# Patient Record
Sex: FEMALE | URBAN - METROPOLITAN AREA
[De-identification: names, ages, dates, MRNs, and addresses within clinical notes are randomized per-mention and may not be internally consistent; named-entity substitution may affect disease eponyms.]

---

## 2019-07-12 ENCOUNTER — BEH TREATMENT PLAN (OUTPATIENT)
Dept: BEHAVIORAL HEALTH | Facility: CLINIC | Age: 34
End: 2019-07-12
Attending: PSYCHIATRY & NEUROLOGY

## 2019-07-12 ENCOUNTER — HOSPITAL ENCOUNTER (OUTPATIENT)
Dept: BEHAVIORAL HEALTH | Facility: CLINIC | Age: 34
Discharge: HOME | End: 2019-07-12
Attending: PSYCHIATRY & NEUROLOGY | Admitting: PSYCHIATRY & NEUROLOGY
Payer: COMMERCIAL

## 2019-07-12 DIAGNOSIS — F33.1 MAJOR DEPRESSIVE DISORDER, RECURRENT EPISODE, MODERATE WITH ANXIOUS DISTRESS (H): ICD-10-CM

## 2019-07-12 PROCEDURE — 90791 PSYCH DIAGNOSTIC EVALUATION: CPT | Performed by: SOCIAL WORKER

## 2019-07-12 RX ORDER — HYDROXYZINE HYDROCHLORIDE 50 MG/1
75 TABLET, FILM COATED ORAL AT BEDTIME
COMMUNITY

## 2019-07-12 RX ORDER — HYDROXYZINE HCL 10 MG/5 ML
75 SOLUTION, ORAL ORAL DAILY
COMMUNITY

## 2019-07-12 RX ORDER — LAMOTRIGINE 200 MG/1
200 TABLET, ORALLY DISINTEGRATING ORAL DAILY
COMMUNITY

## 2019-07-12 RX ORDER — LORAZEPAM 0.5 MG/1
0.5 TABLET ORAL DAILY
COMMUNITY

## 2019-07-12 ASSESSMENT — PAIN SCALES - GENERAL: PAINLEVEL: NO PAIN (0)
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
## 2019-07-12 NOTE — PROGRESS NOTES
Acknowledgement of Current Treatment Plan       I have reviewed my Initial Individual Treatment Plan with my therapist / on 7/12/2019     I agree with the plan as it is written in the electronic health record.                      Signature Below:  Ellen Perea      Patient      Clotilde Ewing [facility]    TIARA Psychotherapist    Admitting Provider: Admitting Provider Signature Below:   Dr Modesto Leyva MD   Psychiatrist    Dr Ping Killian MD  Psychiatrist    Dr Bernard Hopkins MD  Psychiatrist    Dr Tex Hernandez MD  Psychiatrist    Familia Tran, FRITZ  Psychiatric Provider

## 2019-07-12 NOTE — PROGRESS NOTES
"Initial Individual Treatment Plan     Patient: Ellen Perea   MRN: [id number]  : 1985  Age: 33 year old  Sex: female    Diagnostic Assessment Date / Date of Initial Individual Treatment Plan: 19      Immediate Health Concerns:  No     Immediate Safety Concerns:  No    Identify the issues to be addressed in treatment:  Symptom Management, Personal Safety, Community Resources/Discharge Planning, Abstinence/Relapse Prevention, Develop / Improve Independent Living Skills and Develop Socialization / Interpersonal Relationship Skills    Client Initial Individualized Goals for Treatment: While working to titrate off current medications learn coping skills to manage depression and anxiety\".         Initial Treatment suggestions for the client during the time between Diagnostic Assessment and completion of the Individualized Treatment Plan:  Ellen reports she is not having suicidal thinking or intent. 1. Reach out to . 2. call 911. 3. Go to nearest ER.   Ask for more information, support and/or assistance as needed.  Follow up with providers/community supports as needed:   Report increases or changes in symptoms to staff.  Report any personal safety concerns to staff.   Take medications as prescribed.  Report medication changes and/or side effects to staff.  Attend and participate in groups as scheduled or notify staff if unable to do so.  Report any use of substances to staff as this may impact your symptoms and/or  personal safety.  Notify staff if you have any other issues that need to be addressed. This may include  any current abuse / neglect / exploitation or other vulnerability.  Follow recommendations of your treatment team and discuss concerns if not in  agreement.     Treatment Team Responsible: Day Treatment (DT)      Therapeutic Interventions/Treatment Strategies may include:  Support, Redirection, Feedback, Limit/Boundaries, Safety Assessments, Structured Activity, Problem Solving, " Clarification, Education, Motivational Enhancement and Relapse Prevention as needed.    Clotilde Ewing

## 2019-07-18 ENCOUNTER — TRANSFERRED RECORDS (OUTPATIENT)
Dept: HEALTH INFORMATION MANAGEMENT | Facility: CLINIC | Age: 34
End: 2019-07-18

## 2019-07-25 ENCOUNTER — TELEPHONE (OUTPATIENT)
Dept: PSYCHIATRY | Facility: CLINIC | Age: 34
End: 2019-07-25

## 2019-07-25 NOTE — TELEPHONE ENCOUNTER
What is the concern that needs to be addressed by a nurse? Patient wants a call to discuss appointment and the psychiatry program.     May a detailed message be left on voicemail? Yes    Date of last office visit: New patient    Message routed to: Esmer Monzon

## 2019-07-29 ENCOUNTER — HOSPITAL ENCOUNTER (OUTPATIENT)
Dept: BEHAVIORAL HEALTH | Facility: CLINIC | Age: 34
End: 2019-07-29
Attending: PSYCHIATRY & NEUROLOGY
Payer: COMMERCIAL

## 2019-07-29 PROCEDURE — 90853 GROUP PSYCHOTHERAPY: CPT

## 2019-07-29 PROCEDURE — G0177 OPPS/PHP; TRAIN & EDUC SERV: HCPCS

## 2019-07-29 ASSESSMENT — ANXIETY QUESTIONNAIRES
7. FEELING AFRAID AS IF SOMETHING AWFUL MIGHT HAPPEN: MORE THAN HALF THE DAYS
5. BEING SO RESTLESS THAT IT IS HARD TO SIT STILL: SEVERAL DAYS
3. WORRYING TOO MUCH ABOUT DIFFERENT THINGS: NEARLY EVERY DAY
IF YOU CHECKED OFF ANY PROBLEMS ON THIS QUESTIONNAIRE, HOW DIFFICULT HAVE THESE PROBLEMS MADE IT FOR YOU TO DO YOUR WORK, TAKE CARE OF THINGS AT HOME, OR GET ALONG WITH OTHER PEOPLE: EXTREMELY DIFFICULT
GAD7 TOTAL SCORE: 16
6. BECOMING EASILY ANNOYED OR IRRITABLE: MORE THAN HALF THE DAYS
2. NOT BEING ABLE TO STOP OR CONTROL WORRYING: NEARLY EVERY DAY
1. FEELING NERVOUS, ANXIOUS, OR ON EDGE: NEARLY EVERY DAY

## 2019-07-29 ASSESSMENT — PATIENT HEALTH QUESTIONNAIRE - PHQ9
SUM OF ALL RESPONSES TO PHQ QUESTIONS 1-9: 13
5. POOR APPETITE OR OVEREATING: MORE THAN HALF THE DAYS

## 2019-07-29 NOTE — PROGRESS NOTES
Adult Mental Health Day Treatment  TRACK: 2C    PATIENT'S NAME: Ellen Perea  MRN:   [id number]  :   1985  ACCT. NUMBER: [id number]  DATE OF SERVICE: 19  START TIME: 300  END TIME: 350  NUMBER OF PARTICIPANTS: 5      Summary of Group / Topics Discussed:  Communication and Social Skills Development: Communication Styles: Am I Assertive?: Patients completed a self assessment of assertiveness skills and how this impacts their communication with other people.  Patients were given an opportunity to identify strengths as well as areas for improvement in assertive communication.    Patient Session Goals / Objectives:    Identified strengths and areas for improvement in assertive communication and how that impacts their ability to communicate clearly with other people       Improved awareness of important aspects of assertive communication and how this relates to mental health recovery        Established a plan for practice of these skills in their own environments    Practiced and reflected on how to generalize taught skills to their everyday life    Patient Participation / Response:  Moderately participated, sharing some personal reflections / insights and adequately adequately received / provided feedback with other participants.    Verbalized understanding of content, Patient would benefit from additional opportunities to practice the content to be able to generalize it to their everyday life with increased intentionality, consistency, and efficacy in support of their psychiatric recovery and Ellen participated when asked direct questions. She was insightful and offered some feedback to others in the group. She identifyed herself being both passive and assertive in different settings    Treatment Plan:  Patient has an initial individualized treatment plan that was created as part of their diagnostic assessment / admission process.  A master individualized treatment plan is in the process of being developed with  the patient and multi-disciplinary care team.          EVAN RustSW

## 2019-07-30 ENCOUNTER — HOSPITAL ENCOUNTER (OUTPATIENT)
Dept: BEHAVIORAL HEALTH | Facility: CLINIC | Age: 34
End: 2019-07-30
Attending: PSYCHIATRY & NEUROLOGY
Payer: COMMERCIAL

## 2019-07-30 PROBLEM — F33.1 MAJOR DEPRESSIVE DISORDER, RECURRENT EPISODE, MODERATE WITH ANXIOUS DISTRESS (H): Status: ACTIVE | Noted: 2019-07-30

## 2019-07-30 PROCEDURE — G0177 OPPS/PHP; TRAIN & EDUC SERV: HCPCS

## 2019-07-30 PROCEDURE — 90853 GROUP PSYCHOTHERAPY: CPT

## 2019-07-30 ASSESSMENT — ANXIETY QUESTIONNAIRES: GAD7 TOTAL SCORE: 16

## 2019-07-30 NOTE — PROGRESS NOTES
Adult Day Treatment Program:  Individualized Treatment Plan     Date of Plan: 19    Name: Ellen Perea MRN: [id number]    : 1985    Programs:  Day Treatment (DT)     Clinical Track (if applicable):  2C    DSM5 Diagnosis  296.32 (F33.1) Major Depressive Disorder, Recurrent Episode, Moderate _ and With anxious distress    Adult Day Treatment Program Multidisciplinary Team Members: Tex Hernandez MD and/or Familia Tran NP; NITHIN Weinberg, Juan Ferrer, MOTR/L, Kj Akins, OTR/L    Ellen Perea will participate in the Adult Day Treatment Program  3 days per week, 3 hours per day. Anticipated duration/discharge: 12 weeks    Program Start Date: 19  Anticipated Discharge Date: 10/17/19 (pending authorization/clinical changes)    NOTE: Complete CGI     Review Date: Does Ellen Perea continue to meet criteria to participate in the Adult Day Treatment Program, 3 days per week; 3 hours per day?   19 yes CLient discharged                        Client Strengths:  employed, goal-focused, good listener, has a previous history of therapy, insightful, intelligent, motivated, open to learning, open to suggestions / feedback, support of family, friends and providers, supportive, wants to learn, willing to ask questions, willing to relate to others and work history .     Client Participation in Plan:  Contributed to goals and plan   Agrees with plan   Received copy of treatment plan   Discussed with staff     Areas of Vulnerability:  Anxiety  Depressive symptoms   Trauma/Abuse/Neglect    Long-Term Goals:  Knowledge about illness and management of symptoms   Maintenance of personal safety     Abuse Prevention Plan:  Safe, therapeutic environment   Safety coping plan as needed   Education regarding illness and skill development   Coordination with care providers     Discharge Criteria:  Satisfactory progress toward treatment goals   Improvement re: identified problems and symptoms   Ability to continue  recovery at next level of service   Has a discharge plan in place   Has safety/coping plan in place      Areas of Treatment Focus        Area of Treatment Focus:   Personal Safety  Start Date:    8/6/19    Goal:  Target Date: 9/30/19 Status: Completed  Client will notify staff when needing assistance to develop or implement a coping plan to manage suicidal or self injurious urges.  Client will use coping plan for safety, as needed.      Progress:   8/5/19: Client denies issues in this area at this time.  She notes some difficulties hearing about others talk about safety concerns and agrees to talk with staff as needed for support.        Treatment Strategies:   Assist clients in establishing / strengthening support network  Assess / reassess level of potential for harm to self or others  Engage in safety planning when indicated        Area of Treatment Focus:   Symptom Stabilization and Management  Start Date:    8/6/19    Goal:  Target Date: 9/30/19 Status: Completed  Ellen will learn and apply skills to maintain mood stability.        Progress:          Treatment Strategies:   Assist clients in establishing / strengthening support network  Facilitate increased self awareness  Teach adaptive coping skills and communication skills  Use reality based supportive approach        Area of Treatment Focus:  Wellness and mental health  Start Date:    8/6/19    Goal:  Target Date: 9/30/19 Status: Completed  Ellen will improve wellness related behaviors by continuing to effectively titrate down on medications, eat healthy, and work out consistently.        Progress:           Treatment Strategies:  Facilitate increased self-awareness. Provide education regarding wellness habits and routines which can help to improve mental health.           Area of Treatment Focus:   Community Resources / Support and Discharge Planning  Start Date:    8/6/19    Goal:  Target Date: 9/30/19 Status: Completed  Ellen will continue to work with her  "psychiatrist (Olga Rivas, [facility]) and therapist ([facility] [place]).  She will look into additionally community resources as needed especially as she approaches discharge from programming.        Progress:   8/5/19: Ellen reports she might benefit from support in considering other jobs/fields to work in.  She was provided with information about Vocational Rehabilitation to help support this goal.          Treatment Strategies:   Assist clients in establishing / strengthening support network  Assist with discharge planning        Area of Treatment Focus:   Symptom Stabilization and Management  Start Date:    8/6/19    Goal:  Target Date: 9/30/19 Status: Completed  In group Ellen will learn, practice, and generalize 2 sensory modulation or sensorimotor mindfulness based self-regulation skills for inproved ability to stay in the present moment and distress tolerance.  She will use deep breathing and mindfulness techniques to aid her in meeting this goal.          Progress:           Treatment Strategies:  Facilitate increased self-awareness. Provide education regarding sensory modulation or mindfulness based self-regulation skills.             Area of Treatment Focus:   Symptom Stabilization and Management  Start Date:    9/9/19    Goal:  Target Date: 9/30/19 Status: Completed  When in life skills group Ellen will report progress to learn and practice some self compassion strategies to help improve her self esteem providing an update weekly.      Progress:   9/30 Demonstrate a healthy level of self esteem at least 50% of the time.        Treatment Strategies:   Facilitate increased self awareness  Provide education regarding strategies and coping skills to improve self esteem         Amara Parsons, [facility]      NOTE: Required signatures are completed manually and scanned into the electronic medical record. See \"Media\" tab in epic.        "

## 2019-07-30 NOTE — PROGRESS NOTES
Acknowledgement of Current Treatment Plan       I have reviewed my treatment plan with my therapist / counselor on 9/30/19 .   I agree with the plan as it is written in the electronic health record. (2C)    Name:      Signature:  Ellen Hernandez MD  Psychiatrist    Familia Tran, NP    Selma Hernandez, [facility]  Psychotherapist      Nurse Liaison    Kj Akins, OTR/L  Occupational Therapist
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
## 2019-07-30 NOTE — PROGRESS NOTES
"       Adult Mental Health Intensive Outpatient Discharge Summary/Instructions      Patient: Ellen Perea MRN: [id number]   : 1985 Age: 33 year old Sex: female     Admission Date: 19    Discharge Date: 19     Diagnosis: 296.32 (F33.1) Major Depressive Disorder, Recurrent Episode, Moderate _ and With anxious distress    Focus of Treatment / Progress    Personal Safety: Client did not endorse safety concerns     * Follow your safety plan     * Call crisis lines as needed:    [facility], [place] [phone] [facility] [phone]  [facility] [phone] Crisis Connection [phone]  [facility] [phone] [facility] [phone]  [facility] [phone] National Suicide Prevention [phone]  [facility] [phone] Suicide Prevention [phone]  [facility] [phone]    Managing symptoms of:  Anxiety, depression, and trauma    Community support/health:  [place] [address] ([phone]) [email or web address], [place] Crisis text line( Text [place] to 131677),  [facility]  [place], [place] ([phone])  [facility] [place], [place] ( [phone])     Managing Symptoms and Preventing Relapse    * Go to all of your appointments    * Take all medications as directed.      * Carry a current list if medication with you    * Do not use illicit (street) drugs.  Avoid alcohol    * Report these symptoms to your care team. These are early signs of relapse:   Thoughts of suicide   Losing more sleep   Increased confusion   Mood getting worse   Feeling more aggressive   Other:      *Use these skills daily:Talk to someone you trust at least one time weekly, set boundaries and say \"no\", be assertive, act opposite of negative feelings, accept challenges with a positive attitude, exercise at least three times per week for 30 minutes, get enough sleep, eat healthy foods, get into a good routine    Copy of summary sent to: In Epic    Follow " up with psychiatrist / main caregiver: Olga Rivas [facility]   Next visit: Regularly    Follow up with your therapist: Dona Raymond   Next visit: Regularly    Go to group therapy and / or support groups at: [facility] Connection support groups    See your medical doctor about:  For an annual physical exam or any general wellness or illness as needed.    Other:  Your treatment team appreciates having the opportunity to work with you and wishes you the best.    Client Signature:_______________________  Date / Time:___________  Staff Signature:________________________   Date / Time:___________

## 2019-07-31 ENCOUNTER — MEDICAL CORRESPONDENCE (OUTPATIENT)
Dept: HEALTH INFORMATION MANAGEMENT | Facility: CLINIC | Age: 34
End: 2019-07-31

## 2019-07-31 NOTE — PROGRESS NOTES
Adult Mental Health Day Treatment  TRACK: 2C    PATIENT'S NAME: Ellen Perea  MRN:   [id number]  :   1985  ACCT. NUMBER: [id number]  DATE OF SERVICE: 19  START TIME: 3:00 pm  END TIME: 3:50 pm  NUMBER OF PARTICIPANTS: 4      Summary of Group / Topics Discussed:  Relationship Skills: Boundaries: Patients were provided with a general overview of interpersonal boundaries and how lack of boundaries relates to symptoms and functioning. The purpose is to help patients identify boundary issues and gain awareness and skills to work towards healthier interpersonal boundaries. Current awareness of healthy boundary characteristics and barriers to establishing healthy boundaries were discussed.    Patient Session Goals / Objectives:    Familiarized patients with the concept of interpersonal boundaries and their characteristics    Discussed and practiced strategies to promote healthier interpersonal boundaries    Identified boundary issues and identified plan to improve boundaries    Patient Participation / Response:  Fully participated with the group by sharing personal reflections / insights and openly received / provided feedback with other participants.    Demonstrated understanding of topics discussed through group discussion and participation, Demonstrated understanding of relationship skills and communication skills, Identified / Expressed personal readiness to incorporate effective communication skills, Verbalized understanding of communication skills, communication challenges, and communication strengths, Identified plan to address barriers to practicing relationship skills  and Practiced skills in session    Treatment Plan:  Patient has an initial individualized treatment plan that was created as part of their diagnostic assessment / admission process.  A master individualized treatment plan is in the process of being developed with the patient and multi-disciplinary care team.        Selma PETERS  David

## 2019-07-31 NOTE — PROGRESS NOTES
"Adult Mental Health Outpatient Group Therapy Progress Note     Client Initial Individualized Goals for Treatment: \"While working to titrate off current medications learn coping skills to manage depression and anxiety\".     See Initial Treatment suggestions for the client during the time between Diagnostic Assessment and completion of the Master Individualized Treatment Plan.    Treatment Goals:  Follow Safety Plan  Abstain from Substance Use   Ask for more information, support and/or assistance as needed.  Follow up with providers/community supports as needed:   Report increases or changes in symptoms to staff.  Report any personal safety concerns to staff.   Take medications as prescribed.  Report medication changes and/or side effects to staff.  Attend and participate in groups as scheduled or notify staff if unable to do so.  Report any use of substances to staff as this may impact your symptoms and/or            personal safety.  Notify staff if you have any other issues that need to be addressed. This may include    any current abuse / neglect / exploitation or other vulnerability.  Follow recommendations of your treatment team and discuss concerns if not in  agreement.     Area of Treatment Focus:  Symptom Management, Personal Safety, Develop / Improve Independent Living Skills and Develop Socialization / Interpersonal Relationship Skills     Therapeutic Interventions/Treatment Strategies:  Support, Feedback, Safety Assessments, Problem Solving and Education     Response to Treatment Strategies:  Accepted Feedback, Gave Feedback, Listened, Focused on Goals, Attentive and Accepted Support     Area of Treatment Focus:  Symptom Management, Personal Safety, Develop / Improve Independent Living Skills and Develop Socialization / Interpersonal Relationship Skills    Therapeutic Interventions/Treatment Strategies:  Support, Feedback, Safety Assessments, Problem Solving and Education    Response to Treatment " Strategies:  Accepted Feedback, Gave Feedback, Listened, Focused on Goals, Attentive and Accepted Support     Name of Group: Psychotherapy   Date/Time: 7/30/19 / 2:00 to 2:50  Number of Group Participants: 4     Description and Outcome:  Ellen left group for 15 minutes to sort out an issue with medications at her pharmacy.  She reported this triggered fears of psychiatrist being incompetent which happened in the past and lead to several difficulties for her.  Validated and normalized reaction.  Aided in identifying and challenging cognitive distortions that may be present.  Client was able to take perspective of others and feels It may have been a miscommunication that had nothing to do with her psychiatrist and stated she trusts and likes her current psychiatrists.  She reported practicing mindfulness in previous group which helped decrease her frustration feelings and that she is hopeful about group.  She reported a goal to apply skills taught with occupational therapist to manage distress.  Reinforced goal.  She appeared receptive to feedback and did not endorse safety concerns.  She participated well with peers.    Client verbalized understanding of session content by stating plans to use skills to effectively manage frustration.    Is this a Weekly Review of the Progress on the Treatment Plan?  No

## 2019-08-01 ENCOUNTER — HOSPITAL ENCOUNTER (OUTPATIENT)
Dept: BEHAVIORAL HEALTH | Facility: CLINIC | Age: 34
End: 2019-08-01
Attending: PSYCHIATRY & NEUROLOGY
Payer: COMMERCIAL

## 2019-08-01 PROCEDURE — 90853 GROUP PSYCHOTHERAPY: CPT

## 2019-08-01 PROCEDURE — G0177 OPPS/PHP; TRAIN & EDUC SERV: HCPCS

## 2019-08-01 NOTE — PROGRESS NOTES
Adult Mental Health Day Treatment  TRACK: 2C    PATIENT'S NAME: Ellen Perea  MRN:   [id number]  :   1985  ACCT. NUMBER: [id number]  DATE OF SERVICE: 19  START TIME: 1:00  END TIME: 1:50  NUMBER OF PARTICIPANTS: 4    Summary of Group / Topics Discussed:  Self-Regulation Skills: Sensory Enhanced Mindfulness: Patients were provided with written and verbal psychoeducation on the concept of integrating mindfulness with a bottom up, sensory rich, experiential intervention activities to develop self-awareness and skills for self-regulation.  Emphasis placed on the benefits of mindfulness through bottom up (body based) activities and how they can help to emotionally ground oneself or provide a healthy distraction to self-regulate when distressed. Patients worked to increase knowledge and skills during a guided skilled structured sensory enhanced activity: Mindful breathing. Facilitation of reflection to reinforce taught concepts was provided.      Patient Session Goals / Objectives:    Identified how using breath work and  Mindful breathing practices can be used for grounding, stress management, and self-regulation      Improved awareness of different types of breathing patterns/ activities that assist with healthy coping of stress and symptoms      Established a plan for practice of these skills in their own environments    Practiced and reflected on how to generalize taught skills to their everyday life    Patient Participation / Response:  Fully participated with the group by sharing personal reflections / insights and openly received / provided feedback with other participants.    Patient presentation: engaged, good questions, participation. Found the mindful breathing helpful in being more interested in program. , Verbalized understanding of content and Patient would benefit from additional opportunities to practice the content to be able to generalize it to their everyday life with increased intentionality,  consistency, and efficacy in support of their psychiatric recovery    Treatment Plan:  Patient has an initial individualized treatment plan that was created as part of their diagnostic assessment / admission process.  A master individualized treatment plan is in the process of being developed with the patient and multi-disciplinary care team.    Juan Ferrer, OT

## 2019-08-02 NOTE — PROGRESS NOTES
"  Adult Mental Health Outpatient Group Therapy Progress Note       Treatment Goals:     Client Initial Individualized Goals for Treatment: \"While working to titrate off current medications learn coping skills to manage depression and anxiety\".         Area of Treatment Focus:  Symptom Management and Develop Socialization / Interpersonal Relationship Skills    Therapeutic Interventions/Treatment Strategies:  Support, Feedback, Problem Solving, Clarification and Education    Response to Treatment Strategies:  Accepted Feedback, Gave Feedback, Listened, Focused on Goals, Attentive, Accepted Support and Alert    Name of Group:  Psychotherapy  Session time: 9239-9459  5 attending     Description and Outcome:  Client asks for ideas from the group to deal with her alcoholic brother. He wants to rely on her for support and wants to focus on their childhood trauma. Client does not feel in a place to do that now, yet feels guilty to tell him no. We discussed boundaries and the need to prioritize her own needs. She reports feeling better after processing this. No safety concerns.  Client demonstrates an understanding of session content by participating, sharing and giving feedback.    Is this a Weekly Review of the Progress on the Treatment Plan?  Yes.      Are Treatment Plan Goals being addressed?  Yes, continue treatment goals      Are Treatment Plan Strategies to Address Goals Effective?  Yes, continue treatment strategies      Are there any current contracts in place?  No            "

## 2019-08-05 ENCOUNTER — HOSPITAL ENCOUNTER (OUTPATIENT)
Dept: BEHAVIORAL HEALTH | Facility: CLINIC | Age: 34
End: 2019-08-05
Attending: PSYCHIATRY & NEUROLOGY
Payer: COMMERCIAL

## 2019-08-05 PROCEDURE — G0177 OPPS/PHP; TRAIN & EDUC SERV: HCPCS

## 2019-08-05 PROCEDURE — 90853 GROUP PSYCHOTHERAPY: CPT

## 2019-08-05 NOTE — PROGRESS NOTES
Adult Mental Health Day Treatment  TRACK: 2C    PATIENT'S NAME: Ellen Perea  MRN:   [id number]  :   1985  ACCT. NUMBER: [id number]  DATE OF SERVICE: 19  START TIME: 2:00 pm  END TIME: 2:50 pm  NUMBER OF PARTICIPANTS: 3      Summary of Group / Topics Discussed:  Self-Awareness: Values: Patients identified personal values by examining development of their current values and how their values influence their daily functioning and life choices. Patients explored the impact of their values on their thoughts, feelings, and actions. Patients discussed definition of personal values and how they develop and change over time. The goal is to help patients reconcile value conflicts and achieve balance and flexibility to improve mood and daily functioning.     Patient Session Goals / Objectives:    Examined development of values and impact of values on functioning    Identified and prioritized important values related to current well-being     Identified strategies to change or enhance values to positively impact symptoms    Assisted patients to find ways to adapt functioning to better fit their values    Patient Participation / Response:  Fully participated with the group by sharing personal reflections / insights and openly received / provided feedback with other participants.    Demonstrated understanding of topics discussed through group discussion and participation, Demonstrated understanding of values, strengths, and challenges to learn about themselves, Identified / Expressed readiness to act intentionally, increase self-compassion, promote personal growth, Verbalized understanding of ways to proactively manage illness and Identified plan to address barriers to practicing skills that promote self-awareness     Treatment Plan:  Patient has an initial individualized treatment plan that was created as part of their diagnostic assessment / admission process.  A master individualized treatment plan is in the process  of being developed with the patient and multi-disciplinary care team.        Selma Hernandez

## 2019-08-05 NOTE — PROGRESS NOTES
"Adult Mental Health Outpatient Group Therapy Progress Note     Client Initial Individualized Goals for Treatment: \"While working to titrate off current medications learn coping skills to manage depression and anxiety\".     See Initial Treatment suggestions for the client during the time between Diagnostic Assessment and completion of the Master Individualized Treatment Plan.    Treatment Goals:  Follow Safety Plan  Abstain from Substance Use   Ask for more information, support and/or assistance as needed.  Follow up with providers/community supports as needed:   Report increases or changes in symptoms to staff.  Report any personal safety concerns to staff.   Take medications as prescribed.  Report medication changes and/or side effects to staff.  Attend and participate in groups as scheduled or notify staff if unable to do so.  Report any use of substances to staff as this may impact your symptoms and/or            personal safety.  Notify staff if you have any other issues that need to be addressed. This may include    any current abuse / neglect / exploitation or other vulnerability.  Follow recommendations of your treatment team and discuss concerns if not in  agreement.     Area of Treatment Focus:  Symptom Management, Personal Safety, Develop / Improve Independent Living Skills and Develop Socialization / Interpersonal Relationship Skills     Therapeutic Interventions/Treatment Strategies:  Support, Feedback, Safety Assessments, Problem Solving and Education     Response to Treatment Strategies:  Accepted Feedback, Gave Feedback, Listened, Focused on Goals, Attentive and Accepted Support     Area of Treatment Focus:  Symptom Management, Personal Safety, Develop / Improve Independent Living Skills and Develop Socialization / Interpersonal Relationship Skills    Therapeutic Interventions/Treatment Strategies:  Support, Feedback, Safety Assessments, Problem Solving and Education    Response to Treatment " Strategies:  Accepted Feedback, Gave Feedback, Listened, Focused on Goals, Attentive and Accepted Support     Name of Group: Psychotherapy   Date/Time: 8/5/19 / 1:00 to 1:50  Number of Group Participants: 3     Description and Outcome:  Ellen reported having a good weekend getting outside and exercising.  She reported using coping skills including meditation and yoga.  Highlighted skill use and connected to positive outcomes.  She reports physical illness related to titrating off of medications which caused her psychiatrist to want to slow the process down to take a year rather than a few months.  She noted frustration and urges to advocate for self with psychiatrist and  about medications.  Encouraged her to use assertiveness skills discussed to get support in her medication treatment.  She reported desire to do it inpatient to reduce risk and burden on her .  Stated need for her to talk with her psychiatrist.  She reported upcoming stressors related to social engagements and being unsure of how to talk about not working.  Validated and normalized.  Provided suggestions to improve interpersonal communication.  She appeared receptive to feedback and did not endorse safety concerns.    Client verbalized understanding of session content by stating plans to effectively manage social anxiety as needed through skill suggestions.    Is this a Weekly Review of the Progress on the Treatment Plan?  Yes.      Are Treatment Plan Goals being addressed?  Yes, continue treatment goals      Are Treatment Plan Strategies to Address Goals Effective?  Yes, continue treatment strategies      Are there any current contracts in place?  No

## 2019-08-06 ENCOUNTER — HOSPITAL ENCOUNTER (OUTPATIENT)
Dept: BEHAVIORAL HEALTH | Facility: CLINIC | Age: 34
End: 2019-08-06
Attending: PSYCHIATRY & NEUROLOGY
Payer: COMMERCIAL

## 2019-08-06 PROCEDURE — 90853 GROUP PSYCHOTHERAPY: CPT

## 2019-08-06 PROCEDURE — G0177 OPPS/PHP; TRAIN & EDUC SERV: HCPCS

## 2019-08-06 NOTE — PROGRESS NOTES
Past Medical History:   Diagnosis Date     Depressive disorder        Current Outpatient Medications:      hydrOXYzine (ATARAX) 10 MG/5ML syrup, Take 75 mg by mouth daily, Disp: , Rfl:      hydrOXYzine (ATARAX) 50 MG tablet, Take 75 mg by mouth At Bedtime, Disp: , Rfl:      lamoTRIgine (LAMICTAL) 200 MG TBDP ODT tab, Take 200 mg by mouth daily, Disp: , Rfl:      LORazepam (ATIVAN) 0.5 MG tablet, Take 0.5 mg by mouth daily, Disp: , Rfl:     Psychiatry staffing: case discussed  Diagnosis:  MDD, anxiety, r/o ptsd.  Past med issues.

## 2019-08-06 NOTE — PROGRESS NOTES
Adult Mental Health Day Treatment  TRACK: 2C    PATIENT'S NAME: Ellen Perea  MRN:   [id number]  :   1985  ACCT. NUMBER: [id number]  DATE OF SERVICE: 19  START TIME: 1:00  END TIME: 1:50  NUMBER OF PARTICIPANTS: 5      Summary of Group / Topics Discussed:  Sensory Approaches in Mental Health: Coping Through the Senses Introduction: Patients were introduced and taught about neurosensory based skills and strategies related to supporting effective self regulation skills.  Patients were taught about the eight sensory systems and how they can be used for coping with mental health symptoms and stressors.  Patients were provided with an experiential opportunity to increase self-awareness of helpful sensory input and self care activities. Patients were introduced on how to create supportive environments that encourage use of these skills.         Patient Session Goals / Objectives:    Identified specific and individualized neurosensory skills to help when distressed      Identified skills learned and how this applies to current daily life    Established a plan for practice of these skills in their own environments    Patient Participation / Response:  Fully participated with the group by sharing personal reflections / insights and openly received / provided feedback with other participants.    Patient presentation: Engaged in experiential content with good effort and understanding. Began a sensory coping skills tool., Verbalized understanding of content and Patient would benefit from additional opportunities to practice the content to be able to generalize it to their everyday life with increased intentionality, consistency, and efficacy in support of their psychiatric recovery    Treatment Plan:  Patient has an initial individualized treatment plan that was created as part of their diagnostic assessment / admission process.  A master individualized treatment plan is in the process of being developed with the  patient and multi-disciplinary care team.          Juan Ferrer, OT

## 2019-08-06 NOTE — PROGRESS NOTES
"Adult Mental Health Outpatient Group Therapy Progress Note     Client Initial Individualized Goals for Treatment: \"While working to titrate off current medications learn coping skills to manage depression and anxiety\".     See Initial Treatment suggestions for the client during the time between Diagnostic Assessment and completion of the Master Individualized Treatment Plan.    Treatment Goals:  1. Client will notify staff when needing assistance to develop or implement a coping plan to manage suicidal or self injurious urges. Client will use coping plan for safety, as needed.  2. Ellen will learn and apply skills to maintain mood stability.    3. Ellen will improve wellness related behaviors by continuing to effectively titrate down on medications, eat healthy, and work out consistently.  4. Ellen will continue to work with her psychiatrist (Olga Rivas, [facility]) and therapist (Dona Raymond).  She will look into additionally community resources as needed especially as she approaches discharge from programming.    5. In group Ellen will learn, practice, and generalize 2 sensory modulation or sensorimotor mindfulness based self-regulation skills for inproved ability to stay in the present moment and distress tolerance.  She will use deep breathing and mindfulness techniques to aid her in meeting this goal.      Area of Treatment Focus:  Symptom Management, Personal Safety, Develop / Improve Independent Living Skills and Develop Socialization / Interpersonal Relationship Skills     Therapeutic Interventions/Treatment Strategies:  Support, Feedback, Safety Assessments, Problem Solving and Education     Response to Treatment Strategies:  Accepted Feedback, Gave Feedback, Listened, Focused on Goals, Attentive and Accepted Support     Area of Treatment Focus:  Symptom Management, Personal Safety, Develop / Improve Independent Living Skills and Develop Socialization / Interpersonal Relationship " Skills    Therapeutic Interventions/Treatment Strategies:  Support, Feedback, Safety Assessments, Problem Solving and Education    Response to Treatment Strategies:  Accepted Feedback, Gave Feedback, Listened, Focused on Goals, Attentive and Accepted Support     Name of Group: Psychotherapy   Date/Time: 8/6/2019 / 2:00 to 2:50  Number of Group Participants: 5     Description and Outcome:  Ellen reported having a good day going to the gym and trying to be healthy.  She reported noticing the weather was poor prior to the gym and beautiful after the gym and she tried to apply this idea to her own mood- that sometimes it was bad but if you waited and did something effective it can change.  Highlighted and reinforced skill use.  She reported trying to follow through on skills discussed yesterday to identify and adhere to her values so she attended to her value of love for her  by doing something kind for him.  Connected to positive feelings.  She reported preparing for her conversation next week with her psychiatrist by doing research.  Reinforced plans.  She reported goal to meditate, eat well, limit caffeine, and add positive structure over the next few days.  Provided skill suggestions to aid in meeting this goal.  She appeared receptive to feedback and did not endorse safety concerns.    Client verbalized understanding of session content by making challenging but doable goals to attend to over the next few days..    Is this a Weekly Review of the Progress on the Treatment Plan?  No

## 2019-08-06 NOTE — PROGRESS NOTES
Adult Mental Health Day Treatment  TRACK: 2C    PATIENT'S NAME: Ellen Perea  MRN:   [id number]  :   1985  ACCT. NUMBER: [id number]  DATE OF SERVICE: 19  START TIME: 3:00 pm  END TIME: 3:50 pm  NUMBER OF PARTICIPANTS: 5      Summary of Group / Topics Discussed:  Self-Awareness: Personal Strengths: Topic focused on assisting patients in identifying personal strengths and how they relate to the management of mental health symptoms. Patients discussed the benefits of acknowledging their personal strengths and their impact on mood improvement, mindfulness, and perspective. Patients worked to increase time spent on recognition and appreciation of what is positive and working in their lives. The goal is to reduce rumination and negative thinking resulting in increased mindfulness and resilience. Patients will work to put skills into practice and problem-solve barriers.     Patient Session Goals / Objectives:    Identified personal strengths    Identified barriers to recognition of personal strengths    Verbalized understanding of strategies to increase use of their strengths in management of daily symptoms    Patient Participation / Response:  Fully participated with the group by sharing personal reflections / insights and openly received / provided feedback with other participants.    Demonstrated understanding of topics discussed through group discussion and participation, Demonstrated understanding of values, strengths, and challenges to learn about themselves, Identified / Expressed readiness to act intentionally, increase self-compassion, promote personal growth, Verbalized understanding of ways to proactively manage illness, Identified plan to address barriers to practicing skills that promote self-awareness  and Practiced skills in session    Treatment Plan:  Patient has a current master individualized treatment plan.  See Epic treatment plan for more information.        Selma Hernandez

## 2019-08-08 ENCOUNTER — HOSPITAL ENCOUNTER (OUTPATIENT)
Dept: BEHAVIORAL HEALTH | Facility: CLINIC | Age: 34
End: 2019-08-08
Attending: PSYCHIATRY & NEUROLOGY
Payer: COMMERCIAL

## 2019-08-08 PROCEDURE — 90853 GROUP PSYCHOTHERAPY: CPT

## 2019-08-08 PROCEDURE — G0177 OPPS/PHP; TRAIN & EDUC SERV: HCPCS

## 2019-08-08 NOTE — PROGRESS NOTES
"Adult Mental Health Outpatient Group Therapy Progress Note     Client Initial Individualized Goals for Treatment: \"While working to titrate off current medications learn coping skills to manage depression and anxiety\".     See Initial Treatment suggestions for the client during the time between Diagnostic Assessment and completion of the Master Individualized Treatment Plan.    Treatment Goals:  1. Client will notify staff when needing assistance to develop or implement a coping plan to manage suicidal or self injurious urges. Client will use coping plan for safety, as needed.  2. Ellen will learn and apply skills to maintain mood stability.    3. Ellen will improve wellness related behaviors by continuing to effectively titrate down on medications, eat healthy, and work out consistently.  4. Ellen will continue to work with her psychiatrist (Olga Rivas, [facility]) and therapist (Dona Raymond).  She will look into additionally community resources as needed especially as she approaches discharge from programming.    5. In group Ellen will learn, practice, and generalize 2 sensory modulation or sensorimotor mindfulness based self-regulation skills for inproved ability to stay in the present moment and distress tolerance.  She will use deep breathing and mindfulness techniques to aid her in meeting this goal.      Area of Treatment Focus:  Symptom Management, Personal Safety, Develop / Improve Independent Living Skills and Develop Socialization / Interpersonal Relationship Skills     Therapeutic Interventions/Treatment Strategies:  Support, Feedback, Safety Assessments, Problem Solving and Education     Response to Treatment Strategies:  Accepted Feedback, Gave Feedback, Listened, Focused on Goals, Attentive and Accepted Support     Area of Treatment Focus:  Symptom Management, Personal Safety, Develop / Improve Independent Living Skills and Develop Socialization / Interpersonal Relationship " Skills    Therapeutic Interventions/Treatment Strategies:  Support, Feedback, Safety Assessments, Problem Solving and Education    Response to Treatment Strategies:  Accepted Feedback, Gave Feedback, Listened, Focused on Goals, Attentive and Accepted Support     Name of Group: Psychotherapy   Date/Time: 8/8/2019 / 2:00 to 2:50  Number of Group Participants: 5    Description and Outcome:  Ellen reported cooking, working out, meditation, and going to a soccer game with her .  Highlighted and reinforced skill use.  Connected to positive mood.  She reported upcoming social plans and worries that so much socializing will be draining.  Validated and normalized.  Provided skill suggestions to aid in managing social anxiety and pacing self with doing social obligations.  She reported feeling ready for her medication appt next week and wants more structure in her time.  Provided skill suggestions.  She appeared receptive to feedback and did not endorse safety concerns.    Client verbalized understanding of session content by stating plans to use coping skills effectively to manage distress.    Is this a Weekly Review of the Progress on the Treatment Plan?  No

## 2019-08-08 NOTE — PROGRESS NOTES
Adult Mental Health Day Treatment  TRACK: 2C    PATIENT'S NAME: Ellen Perea  MRN:   [id number]  :   1985  ACCT. NUMBER: [id number]  DATE OF SERVICE: 19  START TIME: 3:00 pm  END TIME: 2:50 pm  NUMBER OF PARTICIPANTS: 5      Summary of Group / Topics Discussed:  Self-Awareness: Self-Compassion: Patients received overview of key concepts in developing self-compassion. Patients discussed mindfulness, self-kindness, and finding common humanity. Patients identified their current approach to problems in their lives and learned skills for increasing self-compassion. Patients identified ways they can put self-compassion skills into practice and problem solve barriers to application of skills.     Patient Session Goals / Objectives:    [place] components of self-compassion    Identify ways to practice self-compassion in daily life    Problem solve barriers to self-compassion practice    Patient Participation / Response:  Fully participated with the group by sharing personal reflections / insights and openly received / provided feedback with other participants.    Demonstrated understanding of topics discussed through group discussion and participation, Demonstrated understanding of values, strengths, and challenges to learn about themselves, Identified / Expressed readiness to act intentionally, increase self-compassion, promote personal growth, Verbalized understanding of ways to proactively manage illness, Identified plan to address barriers to practicing skills that promote self-awareness  and Practiced skills in session    Treatment Plan:  Patient has a current master individualized treatment plan.  See Epic treatment plan for more information.        Selma Hernandez

## 2019-08-12 ENCOUNTER — HOSPITAL ENCOUNTER (OUTPATIENT)
Dept: BEHAVIORAL HEALTH | Facility: CLINIC | Age: 34
End: 2019-08-12
Attending: PSYCHIATRY & NEUROLOGY
Payer: COMMERCIAL

## 2019-08-12 PROCEDURE — 90853 GROUP PSYCHOTHERAPY: CPT

## 2019-08-12 PROCEDURE — G0177 OPPS/PHP; TRAIN & EDUC SERV: HCPCS

## 2019-08-12 NOTE — PROGRESS NOTES
Adult Mental Health Day Treatment  TRACK: 2C    PATIENT'S NAME: Ellen Perea  MRN:   [id number]  :   1985  ACCT. NUMBER: [id number]  DATE OF SERVICE: 19  START TIME: 3:00 pm  END TIME: 3:50 pm  NUMBER OF PARTICIPANTS: 5      Summary of Group / Topics Discussed:  Emotions Management: Understanding Emotions: Patients discussed the purpose of emotions and function they serve in our lives.  Reviewed core emotions, why they happen (triggers), and how they occur. The group assisted one anothers' understanding that: emotional experiences are important; difficult emotions have a place in our lives; and the differences between various emotions.    Patient Session Goals / Objectives:    Demonstrate understanding of types various emotions    Identify and discuss specific emotions and when they occur; understand triggers    Discuss barriers to emotional regulation    Patient Participation / Response:  Fully participated with the group by sharing personal reflections / insights and openly received / provided feedback with other participants.    Demonstrated understanding of topics discussed through group discussion and participation, Expressed understanding of the relevance / importance of emotions management skills at distressing times in life, Self-aware of experiences with difficult emotions, and strategies to employ to manage them, Identified barriers to applying emotions management strategies and Identified strategies to overcome barriers to use of emotions management skills    Treatment Plan:  Patient has a current master individualized treatment plan.  See Epic treatment plan for more information.        Selma Hernandez

## 2019-08-12 NOTE — PROGRESS NOTES
"Adult Mental Health Outpatient Group Therapy Progress Note     Client Initial Individualized Goals for Treatment: \"While working to titrate off current medications learn coping skills to manage depression and anxiety\".     See Initial Treatment suggestions for the client during the time between Diagnostic Assessment and completion of the Master Individualized Treatment Plan.    Treatment Goals:  1. Client will notify staff when needing assistance to develop or implement a coping plan to manage suicidal or self injurious urges. Client will use coping plan for safety, as needed.  2. Ellen will learn and apply skills to maintain mood stability.    3. Ellen will improve wellness related behaviors by continuing to effectively titrate down on medications, eat healthy, and work out consistently.  4. Ellen will continue to work with her psychiatrist (Olga Rivas, [facility]) and therapist (Dona Raymond).  She will look into additionally community resources as needed especially as she approaches discharge from programming.    5. In group Ellen will learn, practice, and generalize 2 sensory modulation or sensorimotor mindfulness based self-regulation skills for inproved ability to stay in the present moment and distress tolerance.  She will use deep breathing and mindfulness techniques to aid her in meeting this goal.      Area of Treatment Focus:  Symptom Management, Personal Safety, Develop / Improve Independent Living Skills and Develop Socialization / Interpersonal Relationship Skills     Therapeutic Interventions/Treatment Strategies:  Support, Feedback, Safety Assessments, Problem Solving and Education     Response to Treatment Strategies:  Accepted Feedback, Gave Feedback, Listened, Focused on Goals, Attentive and Accepted Support     Area of Treatment Focus:  Symptom Management, Personal Safety, Develop / Improve Independent Living Skills and Develop Socialization / Interpersonal Relationship " Skills    Therapeutic Interventions/Treatment Strategies:  Support, Feedback, Safety Assessments, Problem Solving and Education    Response to Treatment Strategies:  Accepted Feedback, Gave Feedback, Listened, Focused on Goals, Attentive and Accepted Support     Name of Group: Psychotherapy   Date/Time: 8/12/2019 / 2:00 to 2:50  Number of Group Participants: 5    Description and Outcome:  Ellen reported her meeting with her psychiatrist went well.  She got more information around the titration plan being extended just one week from before and Ellen reported feeling comfortable with this.  Highlighted how open mindedness and assertiveness aided her in getting her needs met.  She reported she is worried about having a follow up conversation with her  as she would also like to ask him to take a less active role in her treatment.  She reports this is largely due to feeling she burdens him. Aided in identifying and challenging cognitive distortions.  Provided skill suggestions around asserting needs and setting boundaries.  She appeared receptive to feedback and did not endorse safety concerns.     Client demonstrated understanding of what was discussed in programming stating plans to plan conversation with her  and continue to monitor for side effects of titrating off of medications.      Is this a Weekly Review of the Progress on the Treatment Plan?  Yes.      Are Treatment Plan Goals being addressed?  Yes, continue treatment goals      Are Treatment Plan Strategies to Address Goals Effective?  Yes, continue treatment strategies      Are there any current contracts in place?  No

## 2019-08-13 ENCOUNTER — HOSPITAL ENCOUNTER (OUTPATIENT)
Dept: BEHAVIORAL HEALTH | Facility: CLINIC | Age: 34
End: 2019-08-13
Attending: PSYCHIATRY & NEUROLOGY
Payer: COMMERCIAL

## 2019-08-13 PROCEDURE — G0177 OPPS/PHP; TRAIN & EDUC SERV: HCPCS

## 2019-08-13 PROCEDURE — 90853 GROUP PSYCHOTHERAPY: CPT

## 2019-08-13 NOTE — PROGRESS NOTES
Adult Mental Health Day Treatment  TRACK: 2C    PATIENT'S NAME: Ellen Perea  MRN:   [id number]  :   1985  ACCT. NUMBER: [id number]  DATE OF SERVICE: 19  START TIME: 3:00 pm  END TIME: 3:50 pm  NUMBER OF PARTICIPANTS: 5      Summary of Group / Topics Discussed:  Emotions Management: Anger: Patients explored and shared personal experiences associated with feelings of anger.  Group explored how these feelings develop, what they mean to each individual, and how to increase acceptance and usefulness of these feelings.  Discussed anger as a  secondary  emotion and reviewed ways to manage anger and challenge associated cognitive distortions. Group members worked to contextualize these concepts and promote healing.     Patient Session Goals / Objectives:    Discuss and review definitions and personal views/experiences with anger    Explore how feelings of anger impact functioning    Understand and practice strategies to manage difficult emotions and move towards healing    Demonstrate understanding of the feelings of anger    Verbalize how these emotions have impacted their lives/functioning    Verbalize of knowledge gained and possible interventions to manage feelings    Patient Participation / Response:  Fully participated with the group by sharing personal reflections / insights and openly received / provided feedback with other participants.    Demonstrated understanding of topics discussed through group discussion and participation, Expressed understanding of the relevance / importance of emotions management skills at distressing times in life, Self-aware of experiences with difficult emotions, and strategies to employ to manage them, Demonstrated knowledge of when to consider applying a variety of emotions management skills in daily life, Demonstrated understanding and practice strategies to manage difficult emotions and move towards healing, Identified barriers to applying emotions management strategies,  Identified strategies to overcome barriers to use of emotions management skills and Identified emotions management strategies that have helped maintain / improve symptoms in the past    Treatment Plan:  Patient has a current master individualized treatment plan.  See Epic treatment plan for more information.        Selma Hernandez

## 2019-08-13 NOTE — PROGRESS NOTES
"Adult Mental Health Outpatient Group Therapy Progress Note     Client Initial Individualized Goals for Treatment: \"While working to titrate off current medications learn coping skills to manage depression and anxiety\".     See Initial Treatment suggestions for the client during the time between Diagnostic Assessment and completion of the Master Individualized Treatment Plan.    Treatment Goals:  1. Client will notify staff when needing assistance to develop or implement a coping plan to manage suicidal or self injurious urges. Client will use coping plan for safety, as needed.  2. Ellen will learn and apply skills to maintain mood stability.    3. Ellen will improve wellness related behaviors by continuing to effectively titrate down on medications, eat healthy, and work out consistently.  4. Ellen will continue to work with her psychiatrist (Olga Rvias, [facility]) and therapist (Dona Raymond).  She will look into additionally community resources as needed especially as she approaches discharge from programming.    5. In group Ellen will learn, practice, and generalize 2 sensory modulation or sensorimotor mindfulness based self-regulation skills for inproved ability to stay in the present moment and distress tolerance.  She will use deep breathing and mindfulness techniques to aid her in meeting this goal.      Area of Treatment Focus:  Symptom Management, Personal Safety, Develop / Improve Independent Living Skills and Develop Socialization / Interpersonal Relationship Skills     Therapeutic Interventions/Treatment Strategies:  Support, Feedback, Safety Assessments, Problem Solving and Education     Response to Treatment Strategies:  Accepted Feedback, Gave Feedback, Listened, Focused on Goals, Attentive and Accepted Support     Area of Treatment Focus:  Symptom Management, Personal Safety, Develop / Improve Independent Living Skills and Develop Socialization / Interpersonal Relationship " Skills    Therapeutic Interventions/Treatment Strategies:  Support, Feedback, Safety Assessments, Problem Solving and Education    Response to Treatment Strategies:  Accepted Feedback, Gave Feedback, Listened, Focused on Goals, Attentive and Accepted Support     Name of Group: Psychotherapy   Date/Time: 8/12/2019 / 2:00 to 2:50  Number of Group Participants: 5    Description and Outcome:  Ellen reported realizing that the indepth conversations she has with her  are often initiated by her and if she just stops initating them she may be able to build more independence.  Highlighted and reinforced insight.  Encouraged her to demonstrate appreciation for his support and still explain why she is no longer initiating these conversations with him to avoid a misunderstanding.  She reported going down on meds (as is plan with psychiatrist) and that she had not felt physically sick yet but does feel lethargic.  Due to lethargy, she has slept a little more and has some shame from growing up about needing to sleep.  Aided in identifying and challenging cognitive distortions to encourage moderation but that a small increase in sleep can be normal.  She reported using deep breathing for mood stability and noticed reduction in crying.  She reported showering to cope when she is feeling upset. Connected skill use to positive outcomes.  Provided other physiological coping skills Client might benefit from.  She noted some anxiety around upcoming event for in-laws.  Validated and normalized.  Offered skill suggestions in assertive communication and coping effectively.  She appeared receptive to feedback and did not endorse safety concerns.      Client demonstrated understanding of what was discussed in programming stating plans to stating plans to manage discomfort with in laws during event.     Is this a Weekly Review of the Progress on the Treatment Plan?  No

## 2019-08-16 NOTE — PROGRESS NOTES
Adult Mental Health Day Treatment  TRACK: 2C    PATIENT'S NAME: Ellen Perea  MRN:   [id number]  :   1985  ACCT. NUMBER: [id number]  DATE OF SERVICE: 19  START TIME: 1:00  END TIME: 1:50  NUMBER OF PARTICIPANTS: 5      Summary of Group / Topics Discussed:  Sensory Approaches in Mental Health: Sensory Enhanced Mindfulness: Patients were taught and provided with an opportunity to explore and practice how using sensory enhanced mindfulness practices can help them stay grounded in the present moment as a way to manage mental health symptoms and stressors.         Patient Session Goals / Objectives:    Identified how using sensory enhanced mindfulness practices can be used for grounding, stress management, and self regulation     Improved awareness of different types of sensory enhanced mindfulness activities that assist with healthy coping of stress and symptoms with an emphasis on proprioception.    Established a plan for practice of these skills in their own environments    Practiced and reflected on how to generalize taught skills to their everyday life    Patient Participation / Response:  Fully participated with the group by sharing personal reflections / insights and openly received / provided feedback with other participants.    Patient presentation: engaged, good effort during experiential process, reflects and shares easily. , Verbalized understanding of content and Patient would benefit from additional opportunities to practice the content to be able to generalize it to their everyday life with increased intentionality, consistency, and efficacy in support of their psychiatric recovery    Treatment Plan:  Patient has a current master individualized treatment plan.  See Epic treatment plan for more information.          Juan Ferrer, OT

## 2019-08-20 ENCOUNTER — HOSPITAL ENCOUNTER (OUTPATIENT)
Dept: BEHAVIORAL HEALTH | Facility: CLINIC | Age: 34
End: 2019-08-20
Attending: PSYCHIATRY & NEUROLOGY
Payer: COMMERCIAL

## 2019-08-20 PROCEDURE — 90853 GROUP PSYCHOTHERAPY: CPT

## 2019-08-20 PROCEDURE — G0177 OPPS/PHP; TRAIN & EDUC SERV: HCPCS

## 2019-08-20 NOTE — PROGRESS NOTES
Adult Mental Health Day Treatment  TRACK: 2C    PATIENT'S NAME: Ellen Perea  MRN:   [id number]  :   1985  ACCT. NUMBER: [id number]  DATE OF SERVICE: 19  START TIME: 3:00 pm  END TIME: 3:50 pm  NUMBER OF PARTICIPANTS: 3       Summary of Group / Topics Discussed:  Emotions Management: Opposite to Emotion: Patients discussed past and present struggles with knowing how to make changes in their lives due to difficult emotional experiences.  Explored desires to experience and feel less anger, sadness, guilt, and fear.  Reviewed the therapeutic skill of opposite action and patients explored opportunities to use their behaviors as a tool to reduce an emotion that they want to change.     Patient Session Goals / Objectives:    Review DBT concepts and focus on patient s experiences of distress and difficult emotional experiences.    Learn how to do the opposite of what an emotion makes us want to do in an effort to decrease an unwanted emotional experience.    Demonstrate understanding of the skill of opposite action by sharing experiences where the technique could be useful in past / present situations.      Patient Participation / Response:  Fully participated with the group by sharing personal reflections / insights and openly received / provided feedback with other participants.    Demonstrated understanding of topics discussed through group discussion and participation, Expressed understanding of the relevance / importance of emotions management skills at distressing times in life, Self-aware of experiences with difficult emotions, and strategies to employ to manage them, Demonstrated knowledge of when to consider applying a variety of emotions management skills in daily life, Demonstrated understanding and practice strategies to manage difficult emotions and move towards healing, Identified barriers to applying emotions management strategies and Identified strategies to overcome barriers to use of emotions  management skills    Treatment Plan:  Patient has a current master individualized treatment plan.  See Epic treatment plan for more information.        Selma Hernandez

## 2019-08-20 NOTE — PROGRESS NOTES
"Adult Mental Health Outpatient Group Therapy Progress Note     Client Initial Individualized Goals for Treatment: \"While working to titrate off current medications learn coping skills to manage depression and anxiety\".     See Initial Treatment suggestions for the client during the time between Diagnostic Assessment and completion of the Master Individualized Treatment Plan.    Treatment Goals:  1. Client will notify staff when needing assistance to develop or implement a coping plan to manage suicidal or self injurious urges. Client will use coping plan for safety, as needed.  2. Ellen will learn and apply skills to maintain mood stability.    3. Ellen will improve wellness related behaviors by continuing to effectively titrate down on medications, eat healthy, and work out consistently.  4. Ellen will continue to work with her psychiatrist (Olga Rivas, [facility]) and therapist (Dona Raymond).  She will look into additionally community resources as needed especially as she approaches discharge from programming.    5. In group Ellen will learn, practice, and generalize 2 sensory modulation or sensorimotor mindfulness based self-regulation skills for inproved ability to stay in the present moment and distress tolerance.  She will use deep breathing and mindfulness techniques to aid her in meeting this goal.      Area of Treatment Focus:  Symptom Management, Personal Safety, Develop / Improve Independent Living Skills and Develop Socialization / Interpersonal Relationship Skills     Therapeutic Interventions/Treatment Strategies:  Support, Feedback, Safety Assessments, Problem Solving and Education     Response to Treatment Strategies:  Accepted Feedback, Gave Feedback, Listened, Focused on Goals, Attentive and Accepted Support     Area of Treatment Focus:  Symptom Management, Personal Safety, Develop / Improve Independent Living Skills and Develop Socialization / Interpersonal Relationship " Skills    Therapeutic Interventions/Treatment Strategies:  Support, Feedback, Safety Assessments, Problem Solving and Education    Response to Treatment Strategies:  Accepted Feedback, Gave Feedback, Listened, Focused on Goals, Attentive and Accepted Support     Name of Group: Psychotherapy   Date/Time: 8/20/2019 / 2:00 to 2:50  Number of Group Participants: 3     Description and Outcome:  Ellen reported continuing to taper down on medications and is noticing some physical effects.  She reported trying to challenge thoughts to make sure she is not magnifying physical reactions. Highlighted and reinforced skill use.  She reported family event went well and she did not need to set boundaries around talking about her mental health as it did not come up.  Reflected how anxiety prior to event ended up not being justified.  She agreed and feels able to set boundaries with them if needed in the future.  She reported feeling like her alcoholic brother's only support as he will not tell their other brothers.  Validated and normalized. Encouraged boundary setting including emotional boundary setting.  Reminded her she is not responsible for the emotions of others and to stick to her personal values with her siblings.  She appeared receptive to feedback and did not endorse safety concerns.    Client verbalized understanding of session content by stating plans to stay on top of her physical health and set boundaries as needed.    Is this a Weekly Review of the Progress on the Treatment Plan?  Yes.      Are Treatment Plan Goals being addressed?  Yes, continue treatment goals      Are Treatment Plan Strategies to Address Goals Effective?  Yes, continue treatment strategies      Are there any current contracts in place?  No

## 2019-08-22 ENCOUNTER — HOSPITAL ENCOUNTER (OUTPATIENT)
Dept: BEHAVIORAL HEALTH | Facility: CLINIC | Age: 34
End: 2019-08-22
Attending: PSYCHIATRY & NEUROLOGY
Payer: COMMERCIAL

## 2019-08-22 PROCEDURE — 90853 GROUP PSYCHOTHERAPY: CPT

## 2019-08-22 NOTE — PROGRESS NOTES
Adult Mental Health Day Treatment  TRACK: 2C    PATIENT'S NAME: Ellen Perea  MRN:   [id number]  :   1985  ACCT. NUMBER: [id number]  DATE OF SERVICE: 19  START TIME: 1300  END TIME: 1350  NUMBER OF PARTICIPANTS: 7      Summary of Group / Topics Discussed:  Cognitive Restructuring: Perfectionism: Patients discussed and reflected on what perfectionism is, how it develops, and how it impacts functioning. Ways to challenge perfectionism were explored and discussed by the group, with the goal of challenging perfectionistic thinking and improving functioning.    Patient Session Goals / Objectives:    Understand the concept of perfectionistic thoughts and how they develop.     Increase recognition of the connection between perfectionistic thinking and symptoms.    Explore and practice new ways to challenge the perfectionistic stance and replace with reasonable expectations of self and others.    Begin to formulate realistic personal expectations and goals.    Patient Participation / Response:  Moderately participated, sharing some personal reflections / insights and adequately adequately received / provided feedback with other participants.    Demonstrated understanding of topics discussed through group discussion and participation    Treatment Plan:  Patient has a current master individualized treatment plan.  See Epic treatment plan for more information.    Client and her 2C peer requested to not attend the remainder of the treatment day as they were merged with the 3C group.    EVAN BarbosaSW

## 2019-08-26 ENCOUNTER — HOSPITAL ENCOUNTER (OUTPATIENT)
Dept: BEHAVIORAL HEALTH | Facility: CLINIC | Age: 34
End: 2019-08-26
Attending: PSYCHIATRY & NEUROLOGY
Payer: COMMERCIAL

## 2019-08-26 PROCEDURE — 90853 GROUP PSYCHOTHERAPY: CPT

## 2019-08-26 PROCEDURE — G0177 OPPS/PHP; TRAIN & EDUC SERV: HCPCS

## 2019-08-26 NOTE — PROGRESS NOTES
Adult Mental Health Day Treatment  TRACK: 2C    PATIENT'S NAME: Ellen Perea  MRN:   [id number]  :   1985  ACCT. NUMBER: [id number]  DATE OF SERVICE: 19  START TIME: 3:00  END TIME: 3:50  NUMBER OF PARTICIPANTS: 3      Summary of Group / Topics Discussed:  Mindfulness: Mindfulness Skills: Patients received information on the main components of mindfulness. Patients participated in discussion on how to practice observing, describing, and participating in internal and external environments. Relevance of mindfulness skills to overall mental and physical health was explored. Patients explored and discussed in group their current awareness and knowledge of mindfulness skills as well as barriers to applying skills.    Patient Session Goals / Objectives:    Demonstrated and verbalized understanding of key mindfulness concepts    Identified when/how to use mindfulness skills    Resolved barriers to practicing mindfulness skills    Identified plan to use mindfulness skills in daily life     Patient Participation / Response:  Fully participated with the group by sharing personal reflections / insights and openly received / provided feedback with other participants. Client shared other strategies with group participants including mediation, and yoga that has been helpful in management of symptoms.    Demonstrated understanding of topics discussed through group discussion and participation, Demonstrated understanding of mindfulness skills and benefits of practice, Identified / Expressed personal readiness to practice mindfulness skills, Verbalized understanding of how mindfulness can benefit mental health symptoms, Identified plan to address barriers to practicing mindfulness skills  and Practiced skills in session    Treatment Plan:  Patient has a current master individualized treatment plan.  See Epic treatment plan for more information.        Shereen Peoples

## 2019-08-26 NOTE — PROGRESS NOTES
Adult Mental Health Outpatient Group Therapy Progress Note     Client Initial Individualized Goals for Treatment: mood    See Initial Treatment suggestions for the client during the time between Diagnostic Assessment and completion of the Master Individualized Treatment Plan.    Treatment Goals:  Goal:                                                                          TargetDate: 9/30/19                                          Status: Active  Client will notify staff when needing assistance to develop or implement a coping plan to manage suicidal or self injurious urges.  Client will use coping plan for safety, as needed.       Goal:                                                                          TargetDate: 9/30/19                                          Status: Active  Ellen will learn and apply skills to maintain mood stability.       Goal:                                                                          TargetDate: 9/30/19                                          Status: Active  Ellen will improve wellness related behaviors by continuing to effectively titrate down on medications, eat healthy, and work out consistently.    Goal:                                                                          TargetDate: 9/30/19                                          Status: Active  Ellen will continue to work with her psychiatrist (Olga Rivas, [facility]) and therapist (Dona Raymond).  She will look into additionally community resources as needed especially as she approaches discharge from programming.       Goal:                                                                          TargetDate: 9/30/19                                          Status: Active  In group Ellen will learn, practice, and generalize 2 sensory modulation or sensorimotor mindfulness based self-regulation skills for inproved ability to stay in the present moment and distress tolerance.  She will use deep  breathing and mindfulness techniques to aid her in meeting this goal.       Area of Treatment Focus:  Symptom Management and Personal Safety    Therapeutic Interventions/Treatment Strategies:  Support and Feedback    Response to Treatment Strategies:  Accepted Feedback, Gave Feedback, Listened, Focused on Goals and Alert     Name of Group: Psychotherapy Date/Time: 8/26/2019 / 2:00 to 2:50    Number of Group Participants: 3    Description and Outcome:  Client reports struggling with physical symptoms of nausea, and feeling lethargic due to tampering of medications. Client reports engaging in self soothing activities and completing  Chores this this weekend including yoga, laundry. Client reports an improved mood on a scale of 1-10, a 7. Client identifies a 10 as her goal (functioning at base line). Client is coping with medication changes, focusing on nutrition, mediation, and trying a new recipe to improve mood. No safety concerns reported, no concerns with alcohol/or illicit drug use. Client demonstrated understanding of session content by being engaged in group. Client verbalized understanding of session content by offering feedback and support to group participants. Client would benefit from additional opportunities to practice and implement content from this session.    Is this a Weekly Review of the Progress on the Treatment Plan?  No

## 2019-08-27 ENCOUNTER — HOSPITAL ENCOUNTER (OUTPATIENT)
Dept: BEHAVIORAL HEALTH | Facility: CLINIC | Age: 34
End: 2019-08-27
Attending: PSYCHIATRY & NEUROLOGY
Payer: COMMERCIAL

## 2019-08-27 PROCEDURE — 90853 GROUP PSYCHOTHERAPY: CPT

## 2019-08-27 PROCEDURE — G0177 OPPS/PHP; TRAIN & EDUC SERV: HCPCS

## 2019-08-27 NOTE — PROGRESS NOTES
Adult Mental Health Day Treatment  TRACK: 2C    PATIENT'S NAME: Ellen Perea  MRN:   [id number]  :   1985  ACCT. NUMBER: [id number]  DATE OF SERVICE: 19  START TIME: 3:00 pm  END TIME: 3:50 pm  NUMBER OF PARTICIPANTS: 3      Summary of Group / Topics Discussed:  Cognitive Restructuring: Distortions: Patients received an overview of how to identify common cognitive distortions. Patients will explore alternatives to cognitive distortions and practice challenging their negative thought patterns. The goal is to help patients target modify ineffective thought patterns.     Patient Session Goals / Objectives:    Familiarized self with ineffective / unhealthy thoughts and how they develop.      Explored impact of ineffective thoughts / distortions on mood and activity    Formulated new neutral/positive alternatives to challenge less helpful / ineffective thoughts.    Practiced and plan to apply in daily life    Patient Participation / Response:  Fully participated with the group by sharing personal reflections / insights and openly received / provided feedback with other participants.    Demonstrated understanding of topics discussed through group discussion and participation, Expressed understanding of the relationship between behaviors, thoughts, and feelings, Demonstrated knowledge of personal thought patterns and how they impact their mood and behavior., Identified barriers to changing thought patterns, Identified / Expressed personal readiness to practice CBT and Verbalized understanding of patient's own thought patterns and how they impact their mood and behaviors    Treatment Plan:  Patient has a current master individualized treatment plan.  See Epic treatment plan for more information.        Selma Hernandez

## 2019-08-27 NOTE — PROGRESS NOTES
"Adult Mental Health Outpatient Group Therapy Progress Note     Client Initial Individualized Goals for Treatment: \"While working to titrate off current medications learn coping skills to manage depression and anxiety\".     See Initial Treatment suggestions for the client during the time between Diagnostic Assessment and completion of the Master Individualized Treatment Plan.    Treatment Goals:  1. Client will notify staff when needing assistance to develop or implement a coping plan to manage suicidal or self injurious urges. Client will use coping plan for safety, as needed.  2. Ellen will learn and apply skills to maintain mood stability.    3. Ellen will improve wellness related behaviors by continuing to effectively titrate down on medications, eat healthy, and work out consistently.  4. Ellen will continue to work with her psychiatrist (Olga Rivas, [facility]) and therapist (Dona Raymond).  She will look into additionally community resources as needed especially as she approaches discharge from programming.    5. In group Ellen will learn, practice, and generalize 2 sensory modulation or sensorimotor mindfulness based self-regulation skills for inproved ability to stay in the present moment and distress tolerance.  She will use deep breathing and mindfulness techniques to aid her in meeting this goal.      Area of Treatment Focus:  Symptom Management, Personal Safety, Develop / Improve Independent Living Skills and Develop Socialization / Interpersonal Relationship Skills     Therapeutic Interventions/Treatment Strategies:  Support, Feedback, Safety Assessments, Problem Solving and Education     Response to Treatment Strategies:  Accepted Feedback, Gave Feedback, Listened, Focused on Goals, Attentive and Accepted Support     Area of Treatment Focus:  Symptom Management, Personal Safety, Develop / Improve Independent Living Skills and Develop Socialization / Interpersonal Relationship " Skills    Therapeutic Interventions/Treatment Strategies:  Support, Feedback, Safety Assessments, Problem Solving and Education    Response to Treatment Strategies:  Accepted Feedback, Gave Feedback, Listened, Focused on Goals, Attentive and Accepted Support     Name of Group: Psychotherapy   Date/Time: 8/27/2019 / 2:00 to 2:50  Number of Group Participants: 3    Description and Outcome:  Ellen reported continuing to taper down on medications and is planning her final step down next week.  She reports she is not feeling great and is having a low mood but is managing.  Aided in identifying skills used to manage mood despite changing medications.  She reported being gentle with herself as well as physical self care (working out, eating well) and emotional self care (reading, meditation).  She reported plans for another family event Thursday where she will miss group and has similar anxieties as to in the past about what to say to in-laws should the topic of her mental health come up.  Validated and normalized.  Reminded Client that fear was present last time and the issue did not come up so it may not come up this time.  Reminded her of her ability to assert self and set boundaries gently.  She reported wanting to begin to think about returning to work when she returns to group next week.  Reinforced plan.  She appeared receptive to feedback and did not endorse safety concerns.      Client verbalized understanding of session content by stating plans to attend to self care and challenge cognitive distortions.    Is this a Weekly Review of the Progress on the Treatment Plan?  Yes.      Are Treatment Plan Goals being addressed?  Yes, continue treatment goals      Are Treatment Plan Strategies to Address Goals Effective?  Yes, continue treatment strategies      Are there any current contracts in place?  No

## 2019-08-27 NOTE — PROGRESS NOTES
Adult Mental Health Day Treatment  TRACK: 2C    PATIENT'S NAME: Ellen Perea  MRN:   [id number]  :   1985  ACCT. NUMBER: [id number]  DATE OF SERVICE: 19  START TIME:1:00  END TIME: 1:50  NUMBER OF PARTICIPANTS: 3    Summary of Group / Topics Discussed:  Self-Regulation Skills: Sensory Enhanced Mindfulness: Patients were provided with written and verbal psychoeducation on the concept of integrating mindfulness with a bottom up, sensory rich, experiential intervention procedure to develop embodied self-awareness and skills for self-regulation.  Emphasis placed on the bottom up (body based) therapeutic process of breath work (Paced breathing patterns and interoceptive awareness) and how it can help to emotionally ground oneself or provide a healthy distraction to self-regulate when distressed.  Facilitation of reflection to reinforce taught concepts was provided.     Patient Session Goals / Objectives:    Identified how using sensory enhanced mindfulness practices can be used for grounding, stress management, and self-regulation with an emphasis on breath work and interoceptive awareness for self regulation.    Improved awareness of different types of sensory enhanced mindfulness activities that assist with healthy coping of stress and symptoms      Established a plan for practice of these skills in their own environments    Practiced and reflected on how to generalize taught skills to their everyday life    Patient Participation / Response:  Fully participated with the group by sharing personal reflections / insights and openly received / provided feedback with other participants.    Verbalized understanding of content and Patient would benefit from additional opportunities to practice the content to be able to generalize it to their everyday life with increased intentionality, consistency, and efficacy in support of their psychiatric recovery    Treatment Plan:  Patient has a current master individualized  treatment plan.  See Epic treatment plan for more information.          Juan Ferrer, OT

## 2019-09-03 ENCOUNTER — HOSPITAL ENCOUNTER (OUTPATIENT)
Dept: BEHAVIORAL HEALTH | Facility: CLINIC | Age: 34
End: 2019-09-03
Attending: PSYCHIATRY & NEUROLOGY
Payer: COMMERCIAL

## 2019-09-03 PROCEDURE — G0177 OPPS/PHP; TRAIN & EDUC SERV: HCPCS

## 2019-09-03 PROCEDURE — 90853 GROUP PSYCHOTHERAPY: CPT

## 2019-09-03 NOTE — PROGRESS NOTES
"  Adult Mental Health Outpatient Group Therapy Progress Note     Client Initial Individualized Goals for Treatment: \"While working to titrate off current medications learn coping skills to manage depression and anxiety\".      See Initial Treatment suggestions for the client during the time between Diagnostic Assessment and completion of the Master Individualized Treatment Plan.     Treatment Goals:  1. Client will notify staff when needing assistance to develop or implement a coping plan to manage suicidal or self injurious urges. Client will use coping plan for safety, as needed.  2. Ellen will learn and apply skills to maintain mood stability.    3. Ellen will improve wellness related behaviors by continuing to effectively titrate down on medications, eat healthy, and work out consistently.  4. Ellen will continue to work with her psychiatrist (Olga Rivas, [facility]) and therapist (Dona Raymond).  She will look into additionally community resources as needed especially as she approaches discharge from programming.    5. In group Ellen will learn, practice, and generalize 2 sensory modulation or sensorimotor mindfulness based self-regulation skills for inproved ability to stay in the present moment and distress tolerance.  She will use deep breathing and mindfulness techniques to aid her in meeting this goal.          Area of Treatment Focus:  Symptom Management    Therapeutic Interventions/Treatment Strategies:  Support, Feedback, Structured Activity, Clarification and Education    Response to Treatment Strategies:  Accepted Feedback, Gave Feedback, Listened, Focused on Goals, Attentive, Accepted Support and Alert    Name of Group:  Mental health management 200-250, 4 participants     Description and Outcome:  The group was provided with a guided breathing and warmup structure with focus on increasing self awareness and on providing an embodied experience.  Discussion included the importance of " listening to body cues as a way of identifying emotion as well as accompanying needs and wants, and as a way of practising self compassion, authenticity, mindfulness, self expression,  and connection to other.  The group were challenged to look at chlea, having beauty and chela within themselves and while moving.  Client demonstrated understanding of session by participating authentically and contributing to discussion.    Is this a Weekly Review of the Progress on the Treatment Plan?  No

## 2019-09-03 NOTE — PROGRESS NOTES
"Adult Mental Health Outpatient Group Therapy Progress Note     Client Initial Individualized Goals for Treatment: \"While working to titrate off current medications learn coping skills to manage depression and anxiety\".     See Initial Treatment suggestions for the client during the time between Diagnostic Assessment and completion of the Master Individualized Treatment Plan.    Treatment Goals:  1. Client will notify staff when needing assistance to develop or implement a coping plan to manage suicidal or self injurious urges. Client will use coping plan for safety, as needed.  2. Ellen will learn and apply skills to maintain mood stability.    3. Ellen will improve wellness related behaviors by continuing to effectively titrate down on medications, eat healthy, and work out consistently.  4. Ellen will continue to work with her psychiatrist (Olga Rivas, [facility]) and therapist (Dona Raymond).  She will look into additionally community resources as needed especially as she approaches discharge from programming.    5. In group Ellen will learn, practice, and generalize 2 sensory modulation or sensorimotor mindfulness based self-regulation skills for inproved ability to stay in the present moment and distress tolerance.  She will use deep breathing and mindfulness techniques to aid her in meeting this goal.      Area of Treatment Focus:  Symptom Management, Personal Safety, Develop / Improve Independent Living Skills and Develop Socialization / Interpersonal Relationship Skills     Therapeutic Interventions/Treatment Strategies:  Support, Feedback, Safety Assessments, Problem Solving and Education     Response to Treatment Strategies:  Accepted Feedback, Gave Feedback, Listened, Focused on Goals, Attentive and Accepted Support     Area of Treatment Focus:  Symptom Management, Personal Safety, Develop / Improve Independent Living Skills and Develop Socialization / Interpersonal Relationship " Skills    Therapeutic Interventions/Treatment Strategies:  Support, Feedback, Safety Assessments, Problem Solving and Education    Response to Treatment Strategies:  Accepted Feedback, Gave Feedback, Listened, Focused on Goals, Attentive and Accepted Support     Name of Group: Psychotherapy   Date/Time: 9/3/19 / 1:00 to 1:50  Number of Group Participants: 4     Description and Outcome:  Ellen reported going down on medications further and noticing increased depression and physical distress.  Validated and normalized.  She reported acknowledging she was not doing physical self care (being active, proper nutrition, etc) and she needed a reset.  Aided in creating a plan to help her attend to physical and emotional self care.  She reported plans to talk with provider in a week and a half about final medication she is on.  She reported going to family event last week and that it went well.  She reported setting boundaries around the topic of her mental health and being able to enjoy the event.  Highlighted and reinforced skills used.  She reported feeling proud of how she has handled stressors and med changes as well as hopeful.  She appeared receptive to feedback and did not endorse safety concerns.     Client verbalized understanding of session content by stating plans to attend to physical and emotional self care this week.      Is this a Weekly Review of the Progress on the Treatment Plan?  Yes.      Are Treatment Plan Goals being addressed?  Yes, continue treatment goals      Are Treatment Plan Strategies to Address Goals Effective?  Yes, continue treatment strategies      Are there any current contracts in place?  No

## 2019-09-03 NOTE — PROGRESS NOTES
Adult Mental Health Day Treatment  TRACK: 2C  PATIENT'S NAME: Ellen Perea  MRN:   [id number]  :   1985  ACCT. NUMBER: [id number]  DATE OF SERVICE: 19  START TIME: 3:00 pm  END TIME: 3:50 pm  NUMBER OF PARTICIPANTS: 4    Summary of Group / Topics Discussed:  Coping Skills: Distraction: Patients learned to mindfully use distraction as a way to decrease heightened stress in the moment.  Patients will identified situations that necessitate healthy distraction strategies.  They explored ways to manage physical symptoms of distress using distraction. The group began to distinguish when this can be useful in their lives or when other strategies would be more relevant or helpful.    Patient Session Goals / Objectives:    Understand the purpose and benefits of using healthy distraction to decrease distress.    Process what happens in the body when using distraction strategies.    Demonstrate understanding of when to use distraction strategies.    Explore patient s current distraction activities, and how to take a more intentional approach to the use of distraction.    Identify and problem solve barriers to applying distraction strategies.    Choose 1-2 healthy distraction strategies to apply during times of distress.    Patient Participation / Response:  Fully participated with the group by sharing personal reflections / insights and openly received / provided feedback with other participants.    Demonstrated understanding of topics discussed through group discussion and participation, Expressed understanding of the relevance / importance of coping skills at distressing times in life, Demonstrated knowledge of when to consider using a variety of coping skills in daily life, Identified barriers to applying coping skills and Identified 2-3 positive coping strategies that have helped maintain / improve symptoms in the past    Treatment Plan:  Patient has a current master individualized treatment plan.  See Epic  treatment plan for more information.        Selma Hernandez

## 2019-09-05 ENCOUNTER — HOSPITAL ENCOUNTER (OUTPATIENT)
Dept: BEHAVIORAL HEALTH | Facility: CLINIC | Age: 34
End: 2019-09-05
Attending: PSYCHIATRY & NEUROLOGY
Payer: COMMERCIAL

## 2019-09-05 PROCEDURE — G0177 OPPS/PHP; TRAIN & EDUC SERV: HCPCS

## 2019-09-05 PROCEDURE — 90853 GROUP PSYCHOTHERAPY: CPT

## 2019-09-05 NOTE — PROGRESS NOTES
Adult Mental Health Outpatient Group Therapy Progress Note     Client Initial Individualized Goals for Treatment: Mood    See Initial Treatment suggestions for the client during the time between Diagnostic Assessment and completion of the Master Individualized Treatment Plan.    Treatment Goals:    1. Client will notify staff when needing assistance to develop or implement a coping plan to manage suicidal or self injurious urges. Client will use coping plan for safety, as needed.  2. Ellen will learn and apply skills to maintain mood stability.    3. Ellen will improve wellness related behaviors by continuing to effectively titrate down on medications, eat healthy, and work out consistently.  4. Ellen will continue to work with her psychiatrist (Olga Rivas, [facility]) and therapist (Dona Raymond).  She will look into additionally community resources as needed especially as she approaches discharge from programming.    5. In group Ellen will learn, practice, and generalize 2 sensory modulation or sensorimotor mindfulness based self-regulation skills for inproved ability to stay in the present moment and distress tolerance.  She will use deep breathing and mindfulness techniques to aid her in meeting this goal.     Area of Treatment Focus:  Symptom Management, Personal Safety, and Clarification    Therapeutic Interventions/Treatment Strategies:  Support, Feedback, Safety Assessments and Clarification    Response to Treatment Strategies:  Accepted Feedback, Gave Feedback, Listened, Focused on Goals, Attentive and Accepted Support     Name of Group: Psychotherapy Date/Time: 9/5/19 / 2:00 to 2:50    Number of Group Participants: 4     Description and Outcome:  Patient arrived to session on time and appears to be focused on using self monitoring skills. Patient indicated tracking mood, while being mindful of physical/medical concerns such as having an UTI and dealing with monthly cycle. Patient will see  psychiatrist next week to explore tampering off another medication. Patient discussed plan to spend recreational time with spouse, and engage in self soothing activities. 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 Patient will to continue to engage in self monitoring skills to track symptoms, and self soothing activies including mediation. Patient reports some level of stress related to family dynamics with parents, but did not elaborate. Patient demonstrated understanding of session content by engaging in group discussion. Patient verbalized understanding of session content by offering feedback and support to peers. Patient would benefit from additional opportunities to practice and implement content from this session.    Is this a Weekly Review of the Progress on the Treatment Plan?  No

## 2019-09-05 NOTE — PROGRESS NOTES
Adult Mental Health Day Treatment  TRACK: 2C    PATIENT'S NAME: Ellen Perea  MRN:   [id number]  :   1985  ACCT. NUMBER: [id number]  DATE OF SERVICE: 19  START TIME: 3:00  END TIME: 3:50  NUMBER OF PARTICIPANTS: 4      Summary of Group / Topics Discussed:  Coping Skills: Self-Soothe: Patients learned to apply self-soothe as a way to decrease heightened stress in the moment.  Patients identified situations that necessitate self-soothe strategies.  They focused on ways to manage physical symptoms of distress using the senses. They discussed how to distinguish when this can be useful in their lives when other strategies are more relevant or helpful.    Patient Session Goals / Objectives:    Understand the purpose of using the senses to decrease distress    Process what happens in the body when using self-soothe strategies    Demonstrate understanding of when to use self-soothe strategies    Identify and problem solve barriers to applying self-soothe strategies.    Choose 1-2 self-soothe strategies to apply during times of distress.    Patient Participation / Response:  Fully participated with the group by sharing personal reflections / insights and openly received / provided feedback with other participants.    Committed to using the following techniques in times of distress: self-soothe    Treatment Plan:  Patient has a current master individualized treatment plan.  See Epic treatment plan for more information.        Shereen Peoples

## 2019-09-10 ENCOUNTER — HOSPITAL ENCOUNTER (OUTPATIENT)
Dept: BEHAVIORAL HEALTH | Facility: CLINIC | Age: 34
End: 2019-09-10
Attending: PSYCHIATRY & NEUROLOGY
Payer: COMMERCIAL

## 2019-09-10 PROCEDURE — 90832 PSYTX W PT 30 MINUTES: CPT

## 2019-09-10 NOTE — PROGRESS NOTES
"                                           Progress Note    Patient Name: Ellen Perea  Date: 9/10/2019         Service Type: Individual  Video Visit: No     Session Start Time: 1:30  Session End Time: 2:00     Session Length: 30 min    Session #:     Attendees: Client attended alone     Treatment Plan Last Reviewed: 9/3/19    DATA  Interactive Complexity: No  Crisis: No       Progress Since Last Session (Related to Symptoms / Goals / Homework):   Symptoms: Improving per client report    Homework: Achieved / completed to satisfaction      Episode of Care Goals: Satisfactory progress - ACTION (Actively working towards change); Intervened by reinforcing change plan / affirming steps taken     Current / Ongoing Stressors and Concerns:   Ongoing titration off of medications, figuring out to do with her life re: career/future work     Treatment Objective(s) Addressed in This Session:   Increase interest, engagement, and pleasure in doing things  Decrease frequency and intensity of feeling down, depressed, hopeless  Improve diet, appetite, mindful eating, and / or meal planning  Identify negative self-talk and behaviors: challenge core beliefs, myths, and actions  Improve concentration, focus, and mindfulness in daily activities        Intervention:   CBT: Client reported getting off of her last major medication 10 days ago and having a wonderful weekend.  She reported fear that things were \"too good\" and she might crash.  Validated and normalized.  Aided in identiyfing and challenging cognitive distortions.  She reported trying to practice gratefulness to help recognize resources she has now.  She reported poor concentration but wonders if it is a final medication still geting out of her system.  She has plans to meet with her psychiatrist in a week to discuss further med changes as needed.  Reinforced plan.  She reported starting acupuncture again and noticed it was helpful.  Connected to reduced difficult feelings and " thoughts.  She reported importance of staying on her routine with her meditations, nutrition, and exercise.  Highlighted skills she is using to do this and reminded her of skills she has used in the past.  She reported feeling unsure about work future which has lead to ruminations.  Validated and normalized. Aided in identifying and challenging some cognitive distortions she has that might be influencing ruminatinative thoughts.  She appeared receptive to feedback and did not endorse safety concerns        ASSESSMENT: Current Emotional / Mental Status (status of significant symptoms):   Risk status (Self / Other harm or suicidal ideation)   Patient denies current fears or concerns for personal safety.   Patient denies current or recent suicidal ideation or behaviors.   Patientdenies current or recent homicidal ideation or behaviors.   Patient denies current or recent self injurious behavior or ideation.   Patient denies other safety concerns.   Patient Patient reports there has been no change in risk factors since their last session.     PatientPatient reports there has been no change in protective factors since their last session.     A safety and risk management plan has been developed including: plan to address safety plan should safety issues present     Appearance:   Appropriate    Eye Contact:   Good    Psychomotor Behavior: Normal    Attitude:   Cooperative    Orientation:   All   Speech    Rate / Production: Normal     Volume:  Normal    Mood:    Normal   Affect:    Appropriate    Thought Content:  Clear    Thought Form:  Coherent  Logical    Insight:    Good      Medication Review:   No changes to current psychiatric medication(s)     Medication Compliance:   Yes activeley working with psychiatrist to titrate off of medications     Changes in Health Issues:   None reported     Chemical Use Review:   Substance Use: Chemical use reviewed, no active concerns identified      Tobacco Use: No current tobacco use.       Diagnosis:  No diagnosis found.    Collateral Reports Completed:   Not Applicable    PLAN: (Patient Tasks / Therapist Tasks / Other)  Create a stick to a routine regarding meditation, nutrition, and exercise.  Limit TV time in favor of reading to take steps towards improving daily structure.      Selma Hernandez  September 10, 2019

## 2019-09-12 ENCOUNTER — HOSPITAL ENCOUNTER (OUTPATIENT)
Dept: BEHAVIORAL HEALTH | Facility: CLINIC | Age: 34
End: 2019-09-12
Attending: PSYCHIATRY & NEUROLOGY
Payer: COMMERCIAL

## 2019-09-12 PROCEDURE — G0177 OPPS/PHP; TRAIN & EDUC SERV: HCPCS

## 2019-09-12 PROCEDURE — 90853 GROUP PSYCHOTHERAPY: CPT

## 2019-09-12 NOTE — PROGRESS NOTES
"Adult Mental Health Outpatient Group Therapy Progress Note     Client Initial Individualized Goals for Treatment: \"While working to titrate off current medications learn coping skills to manage depression and anxiety\".     See Initial Treatment suggestions for the client during the time between Diagnostic Assessment and completion of the Master Individualized Treatment Plan.    Treatment Goals:  1. Client will notify staff when needing assistance to develop or implement a coping plan to manage suicidal or self injurious urges. Client will use coping plan for safety, as needed.  2. Ellen will learn and apply skills to maintain mood stability.    3. Ellen will improve wellness related behaviors by continuing to effectively titrate down on medications, eat healthy, and work out consistently.  4. Ellen will continue to work with her psychiatrist (Olga Rivas, [facility]) and therapist (Dona Raymond).  She will look into additionally community resources as needed especially as she approaches discharge from programming.    5. In group Ellen will learn, practice, and generalize 2 sensory modulation or sensorimotor mindfulness based self-regulation skills for inproved ability to stay in the present moment and distress tolerance.  She will use deep breathing and mindfulness techniques to aid her in meeting this goal.      Area of Treatment Focus:  Symptom Management, Personal Safety, Develop / Improve Independent Living Skills and Develop Socialization / Interpersonal Relationship Skills     Therapeutic Interventions/Treatment Strategies:  Support, Feedback, Safety Assessments, Problem Solving and Education     Response to Treatment Strategies:  Accepted Feedback, Gave Feedback, Listened, Focused on Goals, Attentive and Accepted Support     Area of Treatment Focus:  Symptom Management, Personal Safety, Develop / Improve Independent Living Skills and Develop Socialization / Interpersonal Relationship " "Skills    Therapeutic Interventions/Treatment Strategies:  Support, Feedback, Safety Assessments, Problem Solving and Education    Response to Treatment Strategies:  Accepted Feedback, Gave Feedback, Listened, Focused on Goals, Attentive and Accepted Support     Name of Group: Psychotherapy   Date/Time: 9/12/19 / 2:00 to 2:50  Number of Group Participants: 3     Description and Outcome:  Ellen reported her workout, nutrition, and meditation routine has been helpful in managing symptoms.  Highlighted and reinforced skill use.  Connected to positive outcomes.  She reported mood has been overall good with some down periods.  She reported feeling she is \"night and day different\" than 2 years ago.  Aided in identifying what she has done to help make this change and reinforced.  She reported being unsure of what she wants her job/future to look like.  Validated and normalized.  Provided skill suggestions to aid in working towards this goal.  She appeared receptive to feedback and did not endorse safety concerns.      Client verbalized understanding of session content by stating plans to maintain health routine and begin to consider job paths.    Is this a Weekly Review of the Progress on the Treatment Plan?  No  "

## 2019-09-12 NOTE — PROGRESS NOTES
Adult Mental Health Day Treatment  TRACK: 1b    PATIENT'S NAME: Ellen Perea  MRN:   [id number]  :   1985  ACCT. NUMBER: [id number]  DATE OF SERVICE: 19  START TIME: 1300  END TIME: 1350  NUMBER OF PARTICIPANTS: 3      Summary of Group / Topics Discussed:  Mental Health Maintenance: Social/Coping Bingo: Patients were educated on the importance of balance in meeting our wellness needs. Topic of social/coping was reviewed and patients participated in playing a verbal response style coping/social BINGO game. In this game, patients were challenged to utilize their understanding of themselves and their coping strategies to respond to the questions on their BINGO cards.    Patient Session Goals / Objectives:  ? Identified the importance of balance in wellness  ? Explained the important aspects of socialization/effective coping strategies  ? Listed ways of improving weak areas in social/coping skills    Patient Participation / Response:  Fully participated with the group by sharing personal reflections / insights and openly received / provided feedback with other participants.    Demonstrated understanding of topics discussed through group discussion and participation    Treatment Plan:  Patient has a current master individualized treatment plan.  See Epic treatment plan for more information.          Anai Rodriguez RN

## 2019-09-12 NOTE — PROGRESS NOTES
Adult Mental Health Day Treatment  TRACK: 2C    PATIENT'S NAME: Ellen Perea  MRN:   [id number]  :   1985  ACCT. NUMBER: [id number]  DATE OF SERVICE: 19  START TIME: 3:00 pm  END TIME: 3:50 pm  NUMBER OF PARTICIPANTS: 3      Summary of Group / Topics Discussed:  Behavioral Activation: The Change Process - Behavior Change: Patients explored the process and types of change, including but not limited to, theories of change, steps to making change, methods of changing behavior, and potential barriers.  Patients worked to identify what changes may benefit their daily lives, and work towards a plan to implement change.      Patient Session Goals / Objectives:    Demonstrate understanding of the change process.      Identify positive and negative behavioral patterns.    Make plans to track and implement changes and share experiences in group.    Identify personal barriers to change    Patient Participation / Response:  Fully participated with the group by sharing personal reflections / insights and openly received / provided feedback with other participants.    Demonstrated understanding of topics discussed through group discussion and participation, Expressed understanding of the relationship between behaviors, thoughts, and feelings, Shared experiences and challenges with making behavioral changes, Identified barriers to change, Identified / Expressed personal readiness to make behavioral change and Identified ways to increase goal directed activities    Treatment Plan:  Patient has a current master individualized treatment plan.  See Epic treatment plan for more information.        Selma Hernandez

## 2019-09-16 ENCOUNTER — HOSPITAL ENCOUNTER (OUTPATIENT)
Dept: BEHAVIORAL HEALTH | Facility: CLINIC | Age: 34
End: 2019-09-16
Attending: PSYCHIATRY & NEUROLOGY
Payer: COMMERCIAL

## 2019-09-16 PROCEDURE — 90853 GROUP PSYCHOTHERAPY: CPT

## 2019-09-16 PROCEDURE — G0177 OPPS/PHP; TRAIN & EDUC SERV: HCPCS

## 2019-09-16 NOTE — PROGRESS NOTES
"Adult Mental Health Outpatient Group Therapy Progress Note     Client Initial Individualized Goals for Treatment: \"While working to titrate off current medications learn coping skills to manage depression and anxiety\".     See Initial Treatment suggestions for the client during the time between Diagnostic Assessment and completion of the Master Individualized Treatment Plan.    Treatment Goals:  1. Client will notify staff when needing assistance to develop or implement a coping plan to manage suicidal or self injurious urges. Client will use coping plan for safety, as needed.  2. Ellen will learn and apply skills to maintain mood stability.    3. Ellen will improve wellness related behaviors by continuing to effectively titrate down on medications, eat healthy, and work out consistently.  4. Ellen will continue to work with her psychiatrist (Olga Rivas, [facility]) and therapist (Dona Raymond).  She will look into additionally community resources as needed especially as she approaches discharge from programming.    5. In group Ellen will learn, practice, and generalize 2 sensory modulation or sensorimotor mindfulness based self-regulation skills for inproved ability to stay in the present moment and distress tolerance.  She will use deep breathing and mindfulness techniques to aid her in meeting this goal.      Area of Treatment Focus:  Symptom Management, Personal Safety, Develop / Improve Independent Living Skills and Develop Socialization / Interpersonal Relationship Skills     Therapeutic Interventions/Treatment Strategies:  Support, Feedback, Safety Assessments, Problem Solving and Education     Response to Treatment Strategies:  Accepted Feedback, Gave Feedback, Listened, Focused on Goals, Attentive and Accepted Support     Area of Treatment Focus:  Symptom Management, Personal Safety, Develop / Improve Independent Living Skills and Develop Socialization / Interpersonal Relationship " Skills    Therapeutic Interventions/Treatment Strategies:  Support, Feedback, Safety Assessments, Problem Solving and Education    Response to Treatment Strategies:  Accepted Feedback, Gave Feedback, Listened, Focused on Goals, Attentive and Accepted Support     Name of Group: Psychotherapy   Date/Time: 9/16/19 / 2:00 to 2:50  Number of Group Participants: 5     Description and Outcome:  Given so many new group members, Client gave brief background on what brought her to treatment.  She reported plans to step down on her final medication tomorrow.  She reported sleep has been good and she has been sticking to her routine of meditation, nutrition, and exercise.  Reinforced and connected to improved mood.  She reported plans to return to work until Spring when she will look into other job options.  She reported feeling nervous about returning to work.  Validated and normalized.  Encouraged use of pros and cons to help decide about work and to pace self as she returns.  She reported plans to start  training at this end of this month which she is looking forward to.  Highlighted as a regular planned positive experience and reinforced. She appeared receptive to feedback and did not endorse safety concerns.    Client verbalized understanding of session content by stating plans to monitor mood as she tapers off of final medication    Is this a Weekly Review of the Progress on the Treatment Plan?  Yes.      Are Treatment Plan Goals being addressed?  Yes, continue treatment goals      Are Treatment Plan Strategies to Address Goals Effective?  Yes, continue treatment strategies      Are there any current contracts in place?  No

## 2019-09-16 NOTE — PROGRESS NOTES
Adult Mental Health Day Treatment  TRACK: 2C     PATIENT'S NAME:    Ellen Perea  MRN:                           [id number]  :                           1985  ACCT. NUMBER:       [id number]  DATE OF SERVICE:  19  START TIME: 3:00 pm  END TIME: 3:50 pm  NUMBER OF PARTICIPANTS: 5      Summary of Group / Topics Discussed:  Mindfulness: Mindfulness Skills: Patients received information on the main components of mindfulness. Patients participated in discussion on how to practice observing, describing, and participating in internal and external environments. Relevance of mindfulness skills to overall mental and physical health was explored.  Patients explored and discussed in group their current awareness and knowledge of mindfulness skills as well as barriers to applying skills.    Patient Session Goals / Objectives:    Demonstrated and verbalized understanding of key mindfulness concepts    Identified when/how to use mindfulness skills    Resolved barriers to practicing mindfulness skills    Identified plan to use mindfulness skills in daily life     Patient Participation / Response:  Fully participated with the group by sharing personal reflections / insights and openly received / provided feedback with other participants.    Demonstrated understanding of topics discussed through group discussion and participation, Demonstrated understanding of mindfulness skills and benefits of practice, Identified / Expressed personal readiness to practice mindfulness skills and Verbalized understanding of how mindfulness can benefit mental health symptoms    Treatment Plan:  Patient has a current master individualized treatment plan.  See Epic treatment plan for more information.

## 2019-09-17 ENCOUNTER — HOSPITAL ENCOUNTER (OUTPATIENT)
Dept: BEHAVIORAL HEALTH | Facility: CLINIC | Age: 34
End: 2019-09-17
Attending: PSYCHIATRY & NEUROLOGY
Payer: COMMERCIAL

## 2019-09-17 PROCEDURE — 90853 GROUP PSYCHOTHERAPY: CPT

## 2019-09-17 PROCEDURE — G0177 OPPS/PHP; TRAIN & EDUC SERV: HCPCS

## 2019-09-17 NOTE — PROGRESS NOTES
"Adult Mental Health Outpatient Group Therapy Progress Note     Client Initial Individualized Goals for Treatment: \"While working to titrate off current medications learn coping skills to manage depression and anxiety\".     See Initial Treatment suggestions for the client during the time between Diagnostic Assessment and completion of the Master Individualized Treatment Plan.    Treatment Goals:  1. Client will notify staff when needing assistance to develop or implement a coping plan to manage suicidal or self injurious urges. Client will use coping plan for safety, as needed.  2. Ellen will learn and apply skills to maintain mood stability.    3. Ellen will improve wellness related behaviors by continuing to effectively titrate down on medications, eat healthy, and work out consistently.  4. Ellen will continue to work with her psychiatrist (Olga Rivas, [facility]) and therapist (Dona Raymond).  She will look into additionally community resources as needed especially as she approaches discharge from programming.    5. In group Ellen will learn, practice, and generalize 2 sensory modulation or sensorimotor mindfulness based self-regulation skills for inproved ability to stay in the present moment and distress tolerance.  She will use deep breathing and mindfulness techniques to aid her in meeting this goal.      Area of Treatment Focus:  Symptom Management, Personal Safety, Develop / Improve Independent Living Skills and Develop Socialization / Interpersonal Relationship Skills     Therapeutic Interventions/Treatment Strategies:  Support, Feedback, Safety Assessments, Problem Solving and Education     Response to Treatment Strategies:  Accepted Feedback, Gave Feedback, Listened, Focused on Goals, Attentive and Accepted Support     Area of Treatment Focus:  Symptom Management, Personal Safety, Develop / Improve Independent Living Skills and Develop Socialization / Interpersonal Relationship " Skills    Therapeutic Interventions/Treatment Strategies:  Support, Feedback, Safety Assessments, Problem Solving and Education    Response to Treatment Strategies:  Accepted Feedback, Gave Feedback, Listened, Focused on Goals, Attentive and Accepted Support     Name of Group: Psychotherapy   Date/Time: 9/17/19 / 1:00 to 1:50  Number of Group Participants: 5    Description and Outcome:  Ellen reported sticking to her routine of working out, nutrition, and meditation. Reinforced and connected to positive mood and low symptoms.  She reported planning to return to work in a month and confirmed this with her boss.  Aided in creating a plan to help her feel as best prepared to return to work as she can including creating and sticking to a routine as well as practicing setting boundaries.  She reported plans to continue to titrate off of medications while working with an acupuncturist and someone who will discuss supplements with her.  She reported feeling hopeful about this path.  Highlighted and reinforced Client's efforts that helped her get to this point.  She reported finding a career booklet that will help her figure out strength and interests for next career steps.  Encouraged her to bring it work on with occupational therapist.  She appeared receptive to feedback and did not endorse safety concerns.      Client verbalized understanding of session content by stating plans to maintain routine and continue to monitor mood regarding titrating off of medcations.    Is this a Weekly Review of the Progress on the Treatment Plan?  No

## 2019-09-17 NOTE — PROGRESS NOTES
NUMBER OF PARTICIPANTS: 4      Summary of Group / Topics Discussed:  Resiliency Development: Self Esteem and Self Compassion: Positive Focus: Patients were given the opportunity to reflect and identified the positive aspects of their life.  Patients were taught about the importance of self kindness on mental health wellbeing.  Patients were also given the opportunity to improve self efficacy, self sufficiency, quality of life and a sense of mastery and competency by identifying positive aspects of their life and to instill hope.      Patient Session Goals / Objectives:    Identified personal strengths and qualities about themselves as a way to provide hope and build self-compassion as a way to effectively manage both mental health and substance abuse/abuse symptoms     Improved awareness of positive qualities and how this contribute to the process of recovery and mental health wellbeing and resiliency      Established a plan for practice of these skills in their own environments    Practiced and reflected on how to generalize taught skills to their everyday life    Patient Participation / Response:  Fully participated with the group by sharing personal reflections / insights and openly received / provided feedback with other participants.    Patient presentation: Calm,alert,focused with stable mood, Verbalized understanding of content and Patient would benefit from additional opportunities to practice the content to be able to generalize it to their everyday life with increased intentionality, consistency, and efficacy in support of their psychiatric recovery    Treatment Plan:  Patient has a current master individualized treatment plan.  See Epic treatment plan for more information.

## 2019-09-17 NOTE — PROGRESS NOTES
Adult Mental Health Day Treatment  TRACK: 2C     PATIENT'S NAME:    Ellen Perea  MRN:                           [id number]  :                           1985  ACCT. NUMBER:       [id number]  DATE OF SERVICE:  19  START TIME: 3:00 pm  END TIME: 3:50 pm  NUMBER OF PARTICIPANTS: 6      Summary of Group / Topics Discussed:  Mindfulness: Mindfulness Experiential: Patients received an overview on what mindfulness is and how mindfulness can benefit general health, mental health symptoms, and stressors. The history of mindfulness, its application to mental health therapies, and key concepts were also discussed. Patients discussed current awareness, knowledge, and practice of mindfulness skills. Patients also discussed barriers to mindfulness practice.    Patient Session Goals / Objectives:    Demonstrated and verbalized understanding of key mindfulness concepts    Identified when/how to use mindfulness skills    Resolved barriers to practicing mindfulness skills    Identified plan to use mindfulness skills in daily life     Patient Participation / Response:  Fully participated with the group by sharing personal reflections / insights and openly received / provided feedback with other participants.    Demonstrated understanding of topics discussed through group discussion and participation, Demonstrated understanding of mindfulness skills and benefits of practice, Identified / Expressed personal readiness to practice mindfulness skills and Verbalized understanding of how mindfulness can benefit mental health symptoms    Treatment Plan:  Patient has a current master individualized treatment plan.  See Epic treatment plan for more information.

## 2019-09-17 NOTE — PROGRESS NOTES
Past Medical History:   Diagnosis Date     Depressive disorder        Current Outpatient Medications:      hydrOXYzine (ATARAX) 10 MG/5ML syrup, Take 75 mg by mouth daily, Disp: , Rfl:      hydrOXYzine (ATARAX) 50 MG tablet, Take 75 mg by mouth At Bedtime, Disp: , Rfl:      lamoTRIgine (LAMICTAL) 200 MG TBDP ODT tab, Take 200 mg by mouth daily, Disp: , Rfl:      LORazepam (ATIVAN) 0.5 MG tablet, Take 0.5 mg by mouth daily, Disp: , Rfl:     Psychiatry staffing: case discussed  Diagnosis:  MDD, and r/o PTSD.  Effective group member, hopes to return to work.

## 2019-09-19 ENCOUNTER — HOSPITAL ENCOUNTER (OUTPATIENT)
Dept: BEHAVIORAL HEALTH | Facility: CLINIC | Age: 34
End: 2019-09-19
Attending: PSYCHIATRY & NEUROLOGY
Payer: COMMERCIAL

## 2019-09-19 PROCEDURE — 90853 GROUP PSYCHOTHERAPY: CPT

## 2019-09-19 PROCEDURE — G0177 OPPS/PHP; TRAIN & EDUC SERV: HCPCS

## 2019-09-19 NOTE — PROGRESS NOTES
Adult Mental Health Day Treatment  TRACK: 2C     PATIENT'S NAME:    Ellen Perea  MRN:                           [id number]  :                           1985  ACCT. NUMBER:       [id number]  DATE OF SERVICE:  19  START TIME: 3:00 pm  END TIME: 3:50 pm  NUMBER OF PARTICIPANTS: 6      Summary of Group / Topics Discussed:  Behavioral Activation: Behavior Chain Analysis: Patients explored the steps leading up to identified unwanted behaviors, and ways to replace them with more effective behaviors. Patients examined factors contributing to unwanted behaviors, with a goal of determining ways to interrupt the unhealthy patterns.    Patient Session Goals / Objectives:    Identify thoughts, feelings, and actions that contribute to unwanted behaviors    Identify thoughts, feelings, and actions that contribute to more effective/healthy and desired behaviors    Make plans to track and implement changes and share experiences in group    Identify personal barriers to change    Patient Participation / Response:  Fully participated with the group by sharing personal reflections / insights and openly received / provided feedback with other participants.    Demonstrated understanding of topics discussed through group discussion and participation, Expressed understanding of the relationship between behaviors, thoughts, and feelings, Shared experiences and challenges with making behavioral changes, Identified barriers to change, Identified / Expressed personal readiness to make behavioral change and Identified ways to increase goal directed activities    Treatment Plan:  Patient has a current master individualized treatment plan.  See Epic treatment plan for more information.

## 2019-09-19 NOTE — PROGRESS NOTES
"Adult Mental Health Outpatient Group Therapy Progress Note     Client Initial Individualized Goals for Treatment: \"While working to titrate off current medications learn coping skills to manage depression and anxiety\".     See Initial Treatment suggestions for the client during the time between Diagnostic Assessment and completion of the Master Individualized Treatment Plan.    Treatment Goals:  1. Client will notify staff when needing assistance to develop or implement a coping plan to manage suicidal or self injurious urges. Client will use coping plan for safety, as needed.  2. Ellen will learn and apply skills to maintain mood stability.    3. Ellen will improve wellness related behaviors by continuing to effectively titrate down on medications, eat healthy, and work out consistently.  4. Ellen will continue to work with her psychiatrist (Olga Rivas, [facility]) and therapist (Dona Raymond).  She will look into additionally community resources as needed especially as she approaches discharge from programming.    5. In group Ellen will learn, practice, and generalize 2 sensory modulation or sensorimotor mindfulness based self-regulation skills for inproved ability to stay in the present moment and distress tolerance.  She will use deep breathing and mindfulness techniques to aid her in meeting this goal.      Area of Treatment Focus:  Symptom Management, Personal Safety, Develop / Improve Independent Living Skills and Develop Socialization / Interpersonal Relationship Skills     Therapeutic Interventions/Treatment Strategies:  Support, Feedback, Safety Assessments, Problem Solving and Education     Response to Treatment Strategies:  Accepted Feedback, Gave Feedback, Listened, Focused on Goals, Attentive and Accepted Support     Area of Treatment Focus:  Symptom Management, Personal Safety, Develop / Improve Independent Living Skills and Develop Socialization / Interpersonal Relationship " Skills    Therapeutic Interventions/Treatment Strategies:  Support, Feedback, Safety Assessments, Problem Solving and Education    Response to Treatment Strategies:  Accepted Feedback, Gave Feedback, Listened, Focused on Goals, Attentive and Accepted Support     Name of Group: Psychotherapy  Date/Time: 9/19/2019 / 1:00 to 1:50  Number of Group Participants: 6    Description and Outcome:  Ellen reported titrating down off of medications and feel sit has been going well so far.  She reported texting with her mago to help prepare for return to work in a month.  Highlighted and reinforced skill use.  Explored what she can do to create and maintain routine as she returns to work.  She noted helpfulness of consistently meditating, having balanced nutrition, and exercising.  She reported plans to practice asserting self at work by asking for projects she might want in order to pace return.  She reported going to EMDR and having to try hard to activate herself into distress which lead her to feeling worse after sessions  Encouraged her to be open with provider about this experience.  She appeared receptive to feedback and did not endorse safety concerns.     Client verbalized understanding of session content by stating plans to work on creating and maintaining wellness and self care routine    Is this a Weekly Review of the Progress on the Treatment Plan?  No

## 2019-09-19 NOTE — PROGRESS NOTES
NUMBER OF PARTICIPANTS: 5      Summary of Group / Topics Discussed:  Health Maintenance: Wellness Check-in: Patients met with group facilitator to individually review a holistic wellness check-in to assess patient medication adherence/concerns, appointments, physical and mental health, exercise, nutrition, sleep, socialization, substance use, and need for service/resource referrals.       Patient Session Goals / Objectives:    Discussed various aspects of health management and self-care related to physical and mental health    Demonstrated increased self-awareness of current wellness needs    Developed health literacy skills in navigating the healthcare system and self-advocacy/communicating needs with health care team    Patient Participation / Response:  Fully participated with the group by sharing personal reflections / insights and openly received / provided feedback with other participants.    Demonstrated understanding of topics discussed through group discussion and participation    Treatment Plan:  Patient has a current master individualized treatment plan.  See Epic treatment plan for more information.

## 2019-09-20 NOTE — PROGRESS NOTES
Adult Mental Health Day Treatment  TRACK: 2C     PATIENT'S NAME:    Ellen Perea  MRN:                           [id number]  :                           1985  ACCT. NUMBER:       [id number]  DATE OF SERVICE:  19  START TIME: 2:00 pm  END TIME: 2:50 pm  NUMBER OF PARTICIPANTS: 7      Summary of Group / Topics Discussed:  Sensory Approaches in Mental Health: Sensory Enhanced Mindfulness: Patients were taught and provided with an opportunity to explore and practice how using sensory enhanced mindfulness practices can help them stay grounded in the present moment as a way to manage mental health symptoms and stressors.         Patient Session Goals / Objectives:    Identified how using sensory enhanced mindfulness practices can be used for grounding, stress management, and self regulation      Improved awareness of different types of sensory enhanced mindfulness activities that assist with healthy coping of stress and symptoms      Established a plan for practice of these skills in their own environments    Practiced and reflected on how to generalize taught skills to their everyday life    Patient Participation / Response:  Fully participated with the group by sharing personal reflections / insights and openly received / provided feedback with other participants.    Verbalized understanding of content and Patient would benefit from additional opportunities to practice the content to be able to generalize it to their everyday life with increased intentionality, consistency, and efficacy in support of their psychiatric recovery    Treatment Plan:  Patient has a current master individualized treatment plan.  See Epic treatment plan for more information.

## 2019-09-23 ENCOUNTER — TELEPHONE (OUTPATIENT)
Dept: BEHAVIORAL HEALTH | Facility: CLINIC | Age: 34
End: 2019-09-23

## 2020-03-11 ENCOUNTER — HEALTH MAINTENANCE LETTER (OUTPATIENT)
Age: 35
End: 2020-03-11

## 2021-01-03 ENCOUNTER — HEALTH MAINTENANCE LETTER (OUTPATIENT)
Age: 36
End: 2021-01-03

## 2021-04-22 ENCOUNTER — IMMUNIZATION (OUTPATIENT)
Dept: NURSING | Facility: CLINIC | Age: 36
End: 2021-04-22
Payer: COMMERCIAL

## 2021-04-22 PROCEDURE — 91300 PR COVID VAC PFIZER DIL RECON 30 MCG/0.3 ML IM: CPT

## 2021-04-22 PROCEDURE — 0001A PR COVID VAC PFIZER DIL RECON 30 MCG/0.3 ML IM: CPT

## 2021-04-25 ENCOUNTER — HEALTH MAINTENANCE LETTER (OUTPATIENT)
Age: 36
End: 2021-04-25

## 2021-05-13 ENCOUNTER — IMMUNIZATION (OUTPATIENT)
Dept: NURSING | Facility: CLINIC | Age: 36
End: 2021-05-13
Attending: INTERNAL MEDICINE
Payer: COMMERCIAL

## 2021-05-13 PROCEDURE — 91300 PR COVID VAC PFIZER DIL RECON 30 MCG/0.3 ML IM: CPT

## 2021-05-13 PROCEDURE — 0002A PR COVID VAC PFIZER DIL RECON 30 MCG/0.3 ML IM: CPT

## 2021-10-10 ENCOUNTER — HEALTH MAINTENANCE LETTER (OUTPATIENT)
Age: 36
End: 2021-10-10

## 2022-05-21 ENCOUNTER — HEALTH MAINTENANCE LETTER (OUTPATIENT)
Age: 37
End: 2022-05-21

## 2022-09-18 ENCOUNTER — HEALTH MAINTENANCE LETTER (OUTPATIENT)
Age: 37
End: 2022-09-18

## 2023-06-04 ENCOUNTER — HEALTH MAINTENANCE LETTER (OUTPATIENT)
Age: 38
End: 2023-06-04